# Patient Record
Sex: MALE | Race: WHITE
[De-identification: names, ages, dates, MRNs, and addresses within clinical notes are randomized per-mention and may not be internally consistent; named-entity substitution may affect disease eponyms.]

---

## 2018-08-14 ENCOUNTER — HOSPITAL ENCOUNTER (INPATIENT)
Dept: HOSPITAL 8 - 5SO | Age: 68
LOS: 3 days | Discharge: HOME | DRG: 308 | End: 2018-08-17
Attending: INTERNAL MEDICINE | Admitting: INTERNAL MEDICINE
Payer: MEDICARE

## 2018-08-14 VITALS — SYSTOLIC BLOOD PRESSURE: 120 MMHG | DIASTOLIC BLOOD PRESSURE: 75 MMHG

## 2018-08-14 VITALS — SYSTOLIC BLOOD PRESSURE: 122 MMHG | DIASTOLIC BLOOD PRESSURE: 75 MMHG

## 2018-08-14 VITALS — DIASTOLIC BLOOD PRESSURE: 75 MMHG | SYSTOLIC BLOOD PRESSURE: 115 MMHG

## 2018-08-14 VITALS — BODY MASS INDEX: 29.19 KG/M2 | HEIGHT: 75 IN | WEIGHT: 234.79 LBS

## 2018-08-14 DIAGNOSIS — Z79.84: ICD-10-CM

## 2018-08-14 DIAGNOSIS — Z79.899: ICD-10-CM

## 2018-08-14 DIAGNOSIS — I42.9: ICD-10-CM

## 2018-08-14 DIAGNOSIS — I10: ICD-10-CM

## 2018-08-14 DIAGNOSIS — I25.10: ICD-10-CM

## 2018-08-14 DIAGNOSIS — I48.91: ICD-10-CM

## 2018-08-14 DIAGNOSIS — I49.3: Primary | ICD-10-CM

## 2018-08-14 DIAGNOSIS — I44.0: ICD-10-CM

## 2018-08-14 DIAGNOSIS — E11.9: ICD-10-CM

## 2018-08-14 DIAGNOSIS — N17.0: ICD-10-CM

## 2018-08-14 DIAGNOSIS — Z87.891: ICD-10-CM

## 2018-08-14 DIAGNOSIS — N28.9: ICD-10-CM

## 2018-08-14 DIAGNOSIS — E78.5: ICD-10-CM

## 2018-08-14 DIAGNOSIS — Z88.8: ICD-10-CM

## 2018-08-14 DIAGNOSIS — I25.2: ICD-10-CM

## 2018-08-14 LAB
ANION GAP SERPL CALC-SCNC: 8 MMOL/L (ref 5–15)
CALCIUM SERPL-MCNC: 9 MG/DL (ref 8.5–10.1)
CHLORIDE SERPL-SCNC: 105 MMOL/L (ref 98–107)
CHOL/HDL RATIO: 4.2
CREAT SERPL-MCNC: 2.37 MG/DL (ref 0.7–1.3)
HDL CHOL %: 24 % (ref 26–37)
HDL CHOLESTEROL (DIRECT): 28 MG/DL (ref 40–60)
LDL CHOLESTEROL,CALCULATED: 54 MG/DL (ref 54–169)
LDLC/HDLC SERPL: 1.9 {RATIO} (ref 0.5–3)
T4 FREE SERPL-MCNC: 1.22 NG/DL (ref 0.76–1.46)
TRIGL SERPL-MCNC: 181 MG/DL (ref 50–200)
TSH SERPL-ACNC: 0.67 MIU/L (ref 0.36–3.74)
VLDLC SERPL CALC-MCNC: 36 MG/DL (ref 0–25)

## 2018-08-14 PROCEDURE — 80048 BASIC METABOLIC PNL TOTAL CA: CPT

## 2018-08-14 PROCEDURE — 84443 ASSAY THYROID STIM HORMONE: CPT

## 2018-08-14 PROCEDURE — 84439 ASSAY OF FREE THYROXINE: CPT

## 2018-08-14 PROCEDURE — 85018 HEMOGLOBIN: CPT

## 2018-08-14 PROCEDURE — 36415 COLL VENOUS BLD VENIPUNCTURE: CPT

## 2018-08-14 PROCEDURE — 80061 LIPID PANEL: CPT

## 2018-08-14 PROCEDURE — 71046 X-RAY EXAM CHEST 2 VIEWS: CPT

## 2018-08-14 PROCEDURE — 93005 ELECTROCARDIOGRAM TRACING: CPT

## 2018-08-14 PROCEDURE — 85014 HEMATOCRIT: CPT

## 2018-08-14 RX ADMIN — FENOFIBRATE SCH MG: 145 TABLET, FILM COATED ORAL at 20:48

## 2018-08-14 RX ADMIN — PRAVASTATIN SODIUM SCH MG: 40 TABLET ORAL at 20:48

## 2018-08-14 RX ADMIN — AMLODIPINE BESYLATE SCH MG: 5 TABLET ORAL at 20:48

## 2018-08-14 RX ADMIN — SOTALOL HYDROCHLORIDE SCH MG: 80 TABLET ORAL at 11:27

## 2018-08-14 RX ADMIN — SOTALOL HYDROCHLORIDE SCH MG: 80 TABLET ORAL at 22:25

## 2018-08-14 RX ADMIN — ASPIRIN SCH MG: 325 TABLET, FILM COATED ORAL at 20:47

## 2018-08-14 RX ADMIN — LISINOPRIL SCH MG: 10 TABLET ORAL at 20:48

## 2018-08-15 VITALS — SYSTOLIC BLOOD PRESSURE: 123 MMHG | DIASTOLIC BLOOD PRESSURE: 78 MMHG

## 2018-08-15 VITALS — SYSTOLIC BLOOD PRESSURE: 105 MMHG | DIASTOLIC BLOOD PRESSURE: 55 MMHG

## 2018-08-15 VITALS — DIASTOLIC BLOOD PRESSURE: 71 MMHG | SYSTOLIC BLOOD PRESSURE: 106 MMHG

## 2018-08-15 VITALS — DIASTOLIC BLOOD PRESSURE: 72 MMHG | SYSTOLIC BLOOD PRESSURE: 121 MMHG

## 2018-08-15 VITALS — SYSTOLIC BLOOD PRESSURE: 103 MMHG | DIASTOLIC BLOOD PRESSURE: 62 MMHG

## 2018-08-15 RX ADMIN — LISINOPRIL SCH MG: 10 TABLET ORAL at 21:41

## 2018-08-15 RX ADMIN — AMLODIPINE BESYLATE SCH MG: 5 TABLET ORAL at 21:40

## 2018-08-15 RX ADMIN — PRAVASTATIN SODIUM SCH MG: 40 TABLET ORAL at 21:41

## 2018-08-15 RX ADMIN — FENOFIBRATE SCH MG: 145 TABLET, FILM COATED ORAL at 21:41

## 2018-08-15 RX ADMIN — SOTALOL HYDROCHLORIDE SCH MG: 80 TABLET ORAL at 09:33

## 2018-08-15 RX ADMIN — ASPIRIN SCH MG: 325 TABLET, FILM COATED ORAL at 21:41

## 2018-08-16 VITALS — SYSTOLIC BLOOD PRESSURE: 125 MMHG | DIASTOLIC BLOOD PRESSURE: 77 MMHG

## 2018-08-16 VITALS — DIASTOLIC BLOOD PRESSURE: 60 MMHG | SYSTOLIC BLOOD PRESSURE: 114 MMHG

## 2018-08-16 VITALS — SYSTOLIC BLOOD PRESSURE: 111 MMHG | DIASTOLIC BLOOD PRESSURE: 66 MMHG

## 2018-08-16 VITALS — DIASTOLIC BLOOD PRESSURE: 67 MMHG | SYSTOLIC BLOOD PRESSURE: 105 MMHG

## 2018-08-16 LAB
ANION GAP SERPL CALC-SCNC: 9 MMOL/L (ref 5–15)
CALCIUM SERPL-MCNC: 9.5 MG/DL (ref 8.5–10.1)
CHLORIDE SERPL-SCNC: 103 MMOL/L (ref 98–107)
CREAT SERPL-MCNC: 2.15 MG/DL (ref 0.7–1.3)

## 2018-08-16 RX ADMIN — LISINOPRIL SCH MG: 10 TABLET ORAL at 21:48

## 2018-08-16 RX ADMIN — AMLODIPINE BESYLATE SCH MG: 5 TABLET ORAL at 21:47

## 2018-08-16 RX ADMIN — SOTALOL HYDROCHLORIDE SCH MG: 80 TABLET ORAL at 06:21

## 2018-08-16 RX ADMIN — PRAVASTATIN SODIUM SCH MG: 40 TABLET ORAL at 21:47

## 2018-08-16 RX ADMIN — FENOFIBRATE SCH MG: 145 TABLET, FILM COATED ORAL at 21:48

## 2018-08-16 RX ADMIN — ASPIRIN SCH MG: 325 TABLET, FILM COATED ORAL at 21:00

## 2018-08-17 VITALS — SYSTOLIC BLOOD PRESSURE: 122 MMHG | DIASTOLIC BLOOD PRESSURE: 74 MMHG

## 2018-08-17 VITALS — DIASTOLIC BLOOD PRESSURE: 64 MMHG | SYSTOLIC BLOOD PRESSURE: 110 MMHG

## 2018-08-17 RX ADMIN — SOTALOL HYDROCHLORIDE SCH MG: 80 TABLET ORAL at 05:59

## 2019-02-14 ENCOUNTER — HOSPITAL ENCOUNTER (OUTPATIENT)
Dept: HOSPITAL 8 - CFH | Age: 69
Discharge: HOME | End: 2019-02-14
Attending: INTERNAL MEDICINE
Payer: MEDICARE

## 2019-02-14 DIAGNOSIS — I08.2: Primary | ICD-10-CM

## 2019-02-14 DIAGNOSIS — I25.2: ICD-10-CM

## 2019-02-14 DIAGNOSIS — I11.9: ICD-10-CM

## 2019-02-14 DIAGNOSIS — E11.9: ICD-10-CM

## 2019-02-14 DIAGNOSIS — Z87.891: ICD-10-CM

## 2019-02-14 DIAGNOSIS — I25.10: ICD-10-CM

## 2019-02-14 PROCEDURE — 93306 TTE W/DOPPLER COMPLETE: CPT

## 2020-06-08 ENCOUNTER — OFFICE VISIT (OUTPATIENT)
Dept: ENDOCRINOLOGY | Facility: MEDICAL CENTER | Age: 70
End: 2020-06-08
Payer: MEDICARE

## 2020-06-08 VITALS
HEART RATE: 75 BPM | OXYGEN SATURATION: 90 % | SYSTOLIC BLOOD PRESSURE: 128 MMHG | HEIGHT: 75 IN | DIASTOLIC BLOOD PRESSURE: 74 MMHG | BODY MASS INDEX: 29.84 KG/M2 | WEIGHT: 240 LBS

## 2020-06-08 DIAGNOSIS — Z79.4 TYPE 2 DIABETES MELLITUS WITH HYPERGLYCEMIA, WITH LONG-TERM CURRENT USE OF INSULIN (HCC): ICD-10-CM

## 2020-06-08 DIAGNOSIS — N18.30 CKD STAGE 3 DUE TO TYPE 1 DIABETES MELLITUS (HCC): ICD-10-CM

## 2020-06-08 DIAGNOSIS — E10.22 CKD STAGE 3 DUE TO TYPE 1 DIABETES MELLITUS (HCC): ICD-10-CM

## 2020-06-08 DIAGNOSIS — E11.65 TYPE 2 DIABETES MELLITUS WITH HYPERGLYCEMIA, WITH LONG-TERM CURRENT USE OF INSULIN (HCC): ICD-10-CM

## 2020-06-08 DIAGNOSIS — E78.5 DYSLIPIDEMIA: ICD-10-CM

## 2020-06-08 DIAGNOSIS — Z79.4 LONG-TERM INSULIN USE (HCC): ICD-10-CM

## 2020-06-08 PROCEDURE — 99204 OFFICE O/P NEW MOD 45 MIN: CPT | Performed by: INTERNAL MEDICINE

## 2020-06-08 RX ORDER — INSULIN DEGLUDEC 100 U/ML
30 INJECTION, SOLUTION SUBCUTANEOUS
Qty: 5 PEN | Refills: 6 | Status: SHIPPED | OUTPATIENT
Start: 2020-06-08 | End: 2020-11-25 | Stop reason: SDUPTHER

## 2020-06-08 RX ORDER — SOTALOL HYDROCHLORIDE 80 MG/1
TABLET ORAL DAILY
COMMUNITY
Start: 2020-04-21

## 2020-06-08 RX ORDER — ICOSAPENT ETHYL 1000 MG/1
CAPSULE ORAL
COMMUNITY
Start: 2020-06-04 | End: 2021-10-20

## 2020-06-08 RX ORDER — FENOFIBRATE 160 MG/1
TABLET ORAL
COMMUNITY
Start: 2020-05-24 | End: 2022-05-16 | Stop reason: SDUPTHER

## 2020-06-08 RX ORDER — GLIPIZIDE 10 MG/1
TABLET, FILM COATED, EXTENDED RELEASE ORAL
COMMUNITY
Start: 2020-03-17 | End: 2020-11-25 | Stop reason: SDUPTHER

## 2020-06-08 RX ORDER — SEMAGLUTIDE 1.34 MG/ML
0.5 INJECTION, SOLUTION SUBCUTANEOUS
Qty: 1 PEN | Refills: 11 | Status: SHIPPED | OUTPATIENT
Start: 2020-06-08 | End: 2020-12-16

## 2020-06-08 RX ORDER — TESTOSTERONE CYPIONATE 200 MG/ML
200 INJECTION, SOLUTION INTRAMUSCULAR
COMMUNITY
Start: 2020-05-11 | End: 2022-11-18 | Stop reason: SDUPTHER

## 2020-06-08 RX ORDER — AMLODIPINE BESYLATE 5 MG/1
10 TABLET ORAL DAILY
COMMUNITY
Start: 2020-05-24

## 2020-06-08 RX ORDER — LISINOPRIL 5 MG/1
TABLET ORAL
COMMUNITY
Start: 2020-03-27 | End: 2021-03-01

## 2020-06-08 NOTE — PROGRESS NOTES
"Chief Complaint:  Consult requested by Tushar Stanley M.D. for initial evaluation of Type 2 Diabetes Mellitus    HPI:   Kaveh Mayfield is a 69 y.o. male with Type 2 Diabetes Mellitus diagnosed in 2000.  He denies hospitalizations for DKA in the past.    His A1c was elevated at 10% on Linda 3, 2020 from labs from [facility].  He has a history of coronary artery disease and chronic kidney disease and is being followed by Dr. Reeves and another nephrologist.  He is not sure of the details of his CVD history.  Records are not on hand.  His recent GFR is 30 with a serum creatinine of 2.6 on June 2020 labs are from [facility]    He reports progressive hyperglycemic symptoms such as polyuria and polydipsia.  He was previously on metformin but was taken off this medication.  He is currently just on monotherapy with glipizide extended release 10 mg daily for his diabetes and his fasting sugars are in the 300s.    He monitors blood glucose  1 times per day. Blood glucose values range:Breakfast: 240, 265, 330           He denies hypoglycemic episodes.   He  denies hypoglycemic unawareness. He denies episodes of severe hypoglycemia requiring third party assistance.  He  is not wearing a medical alert bracelet or necklace.  He does not a glucagon emergency kit.    He denies attending diabetes education classes.  Diet: \"unhealthy\" diet in general.    Diabetes Complications   He  denies history of retinopathy.  He denies laser eye surgery. Last eye exam: He is unsure when his last eye exam was completed  He reports history of peripheral sensory neuropathy.  He reports numbness, tingling in both feet.  He denies history of foot sores.   He denies history of kidney damage.  He is not on ACE inhibitor or ARB.   He denies history of coronary artery disease.  He  denies history of stroke and denies TIA.  He denies history of PAD.  He reports history of hyperlipidemia.  He is on triple therapy with Vascepa, " pravastatin and fenofibrate.      ROS:     CONS:     No fever, no chills, no weight loss, no fatigue   EYES:      No diplopia, reports blurry vision, no redness of eyes, no swelling of eyelids   ENT:    No hearing loss, No ear pain, No sore throat, no dysphagia, no neck swelling   CV:     No chest pain, no palpitations, no claudication, no orthopnea, no PND   PULM:    No SOB, no cough, no hemoptysis, no wheezing    GI:   No nausea, no vomiting, no diarrhea, no constipation, no bloody stools   :  Passing urine well, no dysuria, no hematuria   ENDO:   reports polyuria, no polydipsia, no heat intolerance, no cold intolerance   NEURO: No headaches, no dizziness, no convulsions, no tremors   MUSC:  No joint swellings, no arthralgias, no myalgias, no weakness   SKIN:   No rash, no ulcers, no dry skin   PSYCH:   No depression, no anxiety, no difficulty sleeping       Past Medical History:  Patient Active Problem List    Diagnosis Date Noted   • CAD (coronary artery disease) 06/28/2011   • A-fib (HCC) 06/28/2011   • HTN (hypertension) 06/28/2011   • Elevated lipids 06/28/2011       Past Surgical History:  Past Surgical History:   Procedure Laterality Date   • SHOULDER ARTHROSCOPY W/ ROTATOR CUFF REPAIR  8/7/2013    Performed by Mickey Liu M.D. at [facility]   • CLAVICLE DISTAL EXCISION  8/7/2013    Performed by Mickey Liu M.D. at [facility]   • SHOULDER DECOMPRESSION ARTHROSCOPIC  8/7/2013    Performed by Mickey Liu M.D. at [facility]   • ANGIOGRAM  2006    stent placement        Allergies:  Nkda [no known drug allergy]     Current Medications:    Current Outpatient Medications:   •  lisinopril (PRINIVIL) 5 MG Tab, , Disp: , Rfl:   •  testosterone cypionate (DEPO-TESTOSTERONE) 200 MG/ML Solution injection, , Disp: , Rfl:   •  glipiZIDE SR (GLUCOTROL) 10 MG TABLET SR 24 HR, TK 1 T PO  QD FOR 90 DAYS, Disp: , Rfl:   •  VASCEPA 1 g Cap, TK 2 C PO BID  WITH FOOD, Disp: , Rfl:   •  fenofibrate (TRIGLIDE) 160 MG tablet, TK 1 T PO QD UTD, Disp: , Rfl:   •  sotalol (BETAPACE) 80 MG Tab, Take  by mouth every day. Take 1 tablet by mouth every day, Disp: , Rfl:   •  amLODIPine (NORVASC) 5 MG Tab, TK 2 TS PO QD IN THE EVENING, Disp: , Rfl:   •  aspirin (ASA) 325 MG Tab, Take 325 mg by mouth every day., Disp: , Rfl:   •  metformin (GLUCOPHAGE) 500 MG Tab, Take 500 mg by mouth 2 times a day, with meals., Disp: , Rfl:   •  hydrocodone-acetaminophen (NORCO) 5-325 MG Tab per tablet, Take 1-2 Tabs by mouth every four hours as needed (mild pain)., Disp: 20 Tab, Rfl: 0  •  pravastatin (PRAVACHOL) 40 MG tablet, Take 1 tablet by mouth at bedtime. Patient must make appt for further refills., Disp: 90 Tab, Rfl: 0  •  ondansetron (ZOFRAN ODT) 4 MG TABLET DISPERSIBLE, Take 1 Tab by mouth every 8 hours as needed for Nausea/Vomiting. (Patient not taking: Reported on 6/8/2020), Disp: 10 Tab, Rfl: 0  •  MethylPREDNISolone (MEDROL DOSEPAK) 4 MG Tablet Therapy Pack, Sig: Per instructions on pack (Patient not taking: Reported on 6/8/2020), Disp: 1 Kit, Rfl: 0  •  lisinopril (PRINIVIL, ZESTRIL) 40 MG tablet, TAKE ONE TABLET BY MOUTH EVERY DAY (Patient not taking: Reported on 6/8/2020), Disp: 90 Tab, Rfl: 3  •  TESTOSTERONE IM, by Intramuscular route every 14 days., Disp: , Rfl:     Social History:  Social History     Socioeconomic History   • Marital status:      Spouse name: Not on file   • Number of children: Not on file   • Years of education: Not on file   • Highest education level: Not on file   Occupational History   • Not on file   Social Needs   • Financial resource strain: Not on file   • Food insecurity     Worry: Not on file     Inability: Not on file   • Transportation needs     Medical: Not on file     Non-medical: Not on file   Tobacco Use   • Smoking status: Former Smoker     Packs/day: 3.00     Years: 32.00     Pack years: 96.00     Types: Cigarettes     Last attempt to  "quit: 2006     Years since quittin.1   • Smokeless tobacco: Never Used   Substance and Sexual Activity   • Alcohol use: Yes     Alcohol/week: 1.5 oz     Types: 3 drink(s) per week   • Drug use: No   • Sexual activity: Not on file   Lifestyle   • Physical activity     Days per week: Not on file     Minutes per session: Not on file   • Stress: Not on file   Relationships   • Social connections     Talks on phone: Not on file     Gets together: Not on file     Attends Zoroastrian service: Not on file     Active member of club or organization: Not on file     Attends meetings of clubs or organizations: Not on file     Relationship status: Not on file   • Intimate partner violence     Fear of current or ex partner: Not on file     Emotionally abused: Not on file     Physically abused: Not on file     Forced sexual activity: Not on file   Other Topics Concern   • Not on file   Social History Narrative   • Not on file        Family History:   Family History   Problem Relation Age of Onset   • Heart Disease Other    • Hypertension Other    • Stroke Other        PHYSICAL EXAM:   Vital signs: /74 (BP Location: Left arm, Patient Position: Sitting, BP Cuff Size: Large adult)   Pulse 75   Ht 1.905 m (6' 3\")   Wt 108.9 kg (240 lb)   SpO2 90%   BMI 30.00 kg/m²   GENERAL: Well-developed, well-nourished  in no apparent distress.   EYE: No ocular and eyelid asymmetry, Anicteric sclerae,  PERRL, No exophthalmos or lidlag  HENT: Hearing grossly intact, Normocephalic, atraumatic. Pink, moist mucous membranes, No exudate  NECK: Supple. Trachea midline. thyroid is normal in size without nodules or tenderness  CARDIOVASCULAR: Regular rate and rhythm. No murmurs, rubs, or gallops.   LUNGS: Clear to auscultation bilaterally   ABDOMEN: Soft, nontender with positive bowel sounds.   EXTREMITIES: No clubbing, cyanosis, or edema.   NEUROLOGICAL: Cranial nerves II-XII are grossly intact   Symmetric reflexes at the patella no " proximal muscle weakness, No visible tremor with both outstretched hands  LYMPH: No cervical, supraclavicular,  adenopathy palpated.   SKIN: No rashes, lesions. Turgor is normal.  FOOT: Normal sensation to monofilament testing, normal pulses, no ulcers.  Normal Vibration quantitative sensation test.    Labs:  Lab Results   Component Value Date/Time    HBA1C 5.1 04/27/2006 0325       Lab Results   Component Value Date/Time    WBC 8.9 10/17/2016 09:46 PM    RBC 4.52 (L) 10/17/2016 09:46 PM    HEMOGLOBIN 15.1 10/17/2016 09:46 PM    .5 (H) 10/17/2016 09:46 PM    MCH 33.4 (H) 10/17/2016 09:46 PM    MCHC 32.9 (L) 10/17/2016 09:46 PM    RDW 70.4 (H) 10/17/2016 09:46 PM    MPV 11.1 10/17/2016 09:46 PM       Lab Results   Component Value Date/Time    SODIUM 131 (L) 10/17/2016 09:46 PM    POTASSIUM 4.8 10/17/2016 09:46 PM    CHLORIDE 97 10/17/2016 09:46 PM    CO2 23 10/17/2016 09:46 PM    ANION 11.0 10/17/2016 09:46 PM    GLUCOSE 200 (H) 10/17/2016 09:46 PM    BUN 19 10/17/2016 09:46 PM    CREATININE 1.58 (H) 10/17/2016 09:46 PM    CREATININE 0.9 04/27/2006 03:25 AM    CALCIUM 9.8 10/17/2016 09:46 PM    ASTSGOT 46 (H) 10/17/2016 09:46 PM    ALTSGPT 29 10/17/2016 09:46 PM    TBILIRUBIN 1.0 10/17/2016 09:46 PM    ALBUMIN 4.6 10/17/2016 09:46 PM    TOTPROTEIN 7.7 10/17/2016 09:46 PM    GLOBULIN 3.1 10/17/2016 09:46 PM    AGRATIO 1.5 10/17/2016 09:46 PM       No results found for: CHOLSTRLTOT, TRIGLYCERIDE, HDL, LDL, CHOLHDLRAT, NONHDL    No results found for: MICROALBCALC, MALBCRT, MALBEXCR, TIOHCI45, MICROALBUR, MICRALB, UMICROALBUM, MICROALBTIM     No results found for: TSHULTRASEN  No results found for: FREEDIR  No results found for: FREET3  No results found for: THYSTIMIG      ASSESSMENT/PLAN:     1. Type 2 diabetes mellitus with hyperglycemia, with long-term current use of insulin (HCC)  Uncontrolled secondary hyperglycemia  Because he has advanced chronic kidney disease and coronary artery disease   I recommend  starting Tresiba 20 units daily with titration of his basal insulin until fasting sugars at goal or less than 130  I also want him to start Ozempic 0.25 mg once a week  I reviewed the side effects of this medication  Recommend that he cut back on fruit  I am referring him for diabetes education  We will plan for follow-up in 2 months to review sugars    2. Dyslipidemia  Unstable  Continue pravastatin, omega-3 fatty acids and fenofibrate  We will repeat fasting lipids in 3 months    3. CKD stage 3 due to type 1 diabetes mellitus (HCC)  Unstable  Discuss the importance of staying well-hydrated and avoiding potential nephrotoxins    4. Long-term insulin use (HCC)  Patient is on long-term basal insulin therapy and a GLP-1 for type 2 diabetes management      Return in about 2 months (around 8/8/2020).       This patient during there office visit was started on new medication.  Side effects of new medications were discussed with the patient today in the office. The patient was supplied paperwork on this new medication.    Thank you kindly for allowing me to participate in the diabetes care plan for this patient.    Espinoza Poole MD, [facility], [facility]  06/08/20    CC:   Tushar Stanley M.D.

## 2020-06-16 ENCOUNTER — TELEPHONE (OUTPATIENT)
Dept: ENDOCRINOLOGY | Facility: MEDICAL CENTER | Age: 70
End: 2020-06-16

## 2020-06-16 NOTE — TELEPHONE ENCOUNTER
DOCUMENTATION OF PAR STATUS:    1. Name of Medication & Dose: Tresiba 100 units     2. Name of Prescription Coverage Company & phone #: cover my meds    3. Date Prior Auth Submitted: 06/16/20    4. What information was given to obtain insurance decision? Last office notes     5. Prior Auth Status? Pending    6. Patient Notified: yes

## 2020-06-16 NOTE — TELEPHONE ENCOUNTER
DOCUMENTATION OF PAR STATUS:    1. Name of Medication & Dose: Ozempic 0.25-0.25mg/Dose     2. Name of Prescription Coverage Company & phone #: cover my meds    3. Date Prior Auth Submitted: 06/16/2020    4. What information was given to obtain insurance decision? Last office notes    5. Prior Auth Status? Pending    6. Patient Notified: yes

## 2020-08-19 ENCOUNTER — OFFICE VISIT (OUTPATIENT)
Dept: ENDOCRINOLOGY | Facility: MEDICAL CENTER | Age: 70
End: 2020-08-19
Attending: INTERNAL MEDICINE
Payer: MEDICARE

## 2020-08-19 VITALS
WEIGHT: 226.7 LBS | BODY MASS INDEX: 28.19 KG/M2 | DIASTOLIC BLOOD PRESSURE: 72 MMHG | OXYGEN SATURATION: 94 % | SYSTOLIC BLOOD PRESSURE: 120 MMHG | HEIGHT: 75 IN | HEART RATE: 70 BPM

## 2020-08-19 DIAGNOSIS — E78.5 DYSLIPIDEMIA: ICD-10-CM

## 2020-08-19 DIAGNOSIS — N18.30 CKD STAGE 3 DUE TO TYPE 2 DIABETES MELLITUS (HCC): ICD-10-CM

## 2020-08-19 DIAGNOSIS — Z79.4 LONG-TERM INSULIN USE (HCC): ICD-10-CM

## 2020-08-19 DIAGNOSIS — E11.22 CKD STAGE 3 DUE TO TYPE 2 DIABETES MELLITUS (HCC): ICD-10-CM

## 2020-08-19 DIAGNOSIS — Z79.4 TYPE 2 DIABETES MELLITUS WITH HYPERGLYCEMIA, WITH LONG-TERM CURRENT USE OF INSULIN (HCC): ICD-10-CM

## 2020-08-19 DIAGNOSIS — E11.65 TYPE 2 DIABETES MELLITUS WITH HYPERGLYCEMIA, WITH LONG-TERM CURRENT USE OF INSULIN (HCC): ICD-10-CM

## 2020-08-19 LAB
HBA1C MFR BLD: 8.5 % (ref 0–5.6)
INT CON NEG: NEGATIVE
INT CON POS: POSITIVE

## 2020-08-19 PROCEDURE — 83036 HEMOGLOBIN GLYCOSYLATED A1C: CPT | Performed by: INTERNAL MEDICINE

## 2020-08-19 PROCEDURE — 99214 OFFICE O/P EST MOD 30 MIN: CPT | Performed by: INTERNAL MEDICINE

## 2020-08-19 PROCEDURE — 99212 OFFICE O/P EST SF 10 MIN: CPT | Performed by: INTERNAL MEDICINE

## 2020-08-20 NOTE — PROGRESS NOTES
CHIEF COMPLAINT: Patient is here for follow up of Type 2 Diabetes Mellitus    HPI:     Kaveh Mayfield is a 69 y.o. male with Type 2 Diabetes Mellitus here for follow up.    Labs from 8/19/2020 HbA1c is improved at 8.5%  His baseline A1c was 10% at [facility] back in May 2020  Most of his labs are done at an outside facility      I first saw him as a consult on July 2020 for uncontrolled diabetes.  He has a history of coronary artery disease, chronic kidney disease st III and obesity  After his initial consultation visit I started him on a GLP-1.  Unfortunately because of his chronic kidney disease he is not a candidate for an SGLT2 inhibitor.        On follow-up he is taking Tresiba 20 units daily, Ozempic 0.25 mg once a week and glipizide XL 10 mg daily    He reports feeling better.  He is tolerating his medications and denies nausea and vomiting  He denies severe hyperglycemia  He denies hypoglycemia      He has mixed hyperlipidemia at baseline and is taking pravastatin and fenofibrate  He is tolerating both medications well  He denies muscle aches and cramps  We do not have a recent lipid panel on hand      Regards his chronic kidney disease he reports that he is doing well, we do not have a recent GFR or CMP level on hand.  Most of his labs unfortunately are done at [place]  He is being followed by nephrologist Dr. Reeves  He reports that his last GFR was 30 on June 2020 and his serum creatinine was 2.6.  He is unsure if he has proteinuria  He is on an ACE inhibitor      He is also on sotalol because of paroxysmal A. fib  He has hypogonadism and is on testosterone injections but this is being managed by another physician      BG Diary:08/19/20  Breakfast: 100, 102, 110    Weight has decreased 10 pounds over last 2 months    Diabetes Complications   Retinopathy: No known retinopathy.  Last eye exam: We are requesting records of his eye exam  Neuropathy: Denies paresthesias or numbness in  hands or feet. Denies any foot wounds.  Exercise: Minimal.  Diet: Fair.  Patient's medications, allergies, and social histories were reviewed and updated as appropriate.    ROS:     CONS:     No fever, no chills   EYES:     No diplopia, no blurry vision   CV:           No chest pain, no palpitations   PULM:     No SOB, no cough, no hemoptysis.   GI:            No nausea, no vomiting, no diarrhea, no constipation   ENDO:     No polyuria, no polydipsia, no heat intolerance, no cold intolerance       Past Medical History:  Problem List:  2020: Type 2 diabetes mellitus with hyperglycemia, with long-term   current use of insulin ([facility])  2020: Dyslipidemia  2020: CKD stage 3 due to type 1 diabetes mellitus ([facility])  2020: Long-term insulin use ([facility])  2011: CAD (coronary artery disease)  2011: A-fib ([facility])  2011: HTN (hypertension)  2011: Elevated lipids      Past Surgical History:  Past Surgical History:   Procedure Laterality Date   • SHOULDER ARTHROSCOPY W/ ROTATOR CUFF REPAIR  2013    Performed by Mickey Liu M.D. at [facility]   • CLAVICLE DISTAL EXCISION  2013    Performed by Mickey Liu M.D. at [facility]   • SHOULDER DECOMPRESSION ARTHROSCOPIC  2013    Performed by Mickey Liu M.D. at [facility]   • ANGIOGRAM  2006    stent placement        Allergies:  Nkda [no known drug allergy]     Social History:  Social History     Tobacco Use   • Smoking status: Former Smoker     Packs/day: 3.00     Years: 32.00     Pack years: 96.00     Types: Cigarettes     Quit date: 2006     Years since quittin.3   • Smokeless tobacco: Never Used   Substance Use Topics   • Alcohol use: Yes     Alcohol/week: 1.5 oz     Types: 3 drink(s) per week   • Drug use: No        Family History:   family history includes Heart Disease in an other family member; Hypertension in an other family member; Stroke in an other family  "member.      PHYSICAL EXAM:   OBJECTIVE:  Vital signs: /72 (BP Location: Left arm, Patient Position: Sitting, BP Cuff Size: Adult)   Pulse 70   Ht 1.905 m (6' 3\")   Wt 102.8 kg (226 lb 11.2 oz)   SpO2 94%   BMI 28.34 kg/m²   GENERAL: Well-developed, well-nourished in no apparent distress.   EYE:  No ocular asymmetry, PERRLA  HENT: Pink, moist mucous membranes.    NECK: No thyromegaly.   CARDIOVASCULAR:  No murmurs  LUNGS: Clear breath sounds  ABDOMEN: Soft, nontender   EXTREMITIES: No clubbing, cyanosis, or edema.   NEUROLOGICAL: No gross focal motor abnormalities   LYMPH: No cervical adenopathy palpated.   SKIN: No rashes, lesions.     Labs:  Lab Results   Component Value Date/Time    HBA1C 8.5 (A) 08/19/2020 12:00 PM        Lab Results   Component Value Date/Time    WBC 8.9 10/17/2016 09:46 PM    RBC 4.52 (L) 10/17/2016 09:46 PM    HEMOGLOBIN 15.1 10/17/2016 09:46 PM    .5 (H) 10/17/2016 09:46 PM    MCH 33.4 (H) 10/17/2016 09:46 PM    MCHC 32.9 (L) 10/17/2016 09:46 PM    RDW 70.4 (H) 10/17/2016 09:46 PM    MPV 11.1 10/17/2016 09:46 PM       Lab Results   Component Value Date/Time    SODIUM 131 (L) 10/17/2016 09:46 PM    POTASSIUM 4.8 10/17/2016 09:46 PM    CHLORIDE 97 10/17/2016 09:46 PM    CO2 23 10/17/2016 09:46 PM    ANION 11.0 10/17/2016 09:46 PM    GLUCOSE 200 (H) 10/17/2016 09:46 PM    BUN 19 10/17/2016 09:46 PM    CREATININE 1.58 (H) 10/17/2016 09:46 PM    CREATININE 0.9 04/27/2006 03:25 AM    CALCIUM 9.8 10/17/2016 09:46 PM    ASTSGOT 46 (H) 10/17/2016 09:46 PM    ALTSGPT 29 10/17/2016 09:46 PM    TBILIRUBIN 1.0 10/17/2016 09:46 PM    ALBUMIN 4.6 10/17/2016 09:46 PM    TOTPROTEIN 7.7 10/17/2016 09:46 PM    GLOBULIN 3.1 10/17/2016 09:46 PM    AGRATIO 1.5 10/17/2016 09:46 PM       No results found for: CHOLSTRLTOT, TRIGLYCERIDE, HDL, LDL, CHOLHDLRAT, NONHDL    No results found for: MICROALBCALC, MALBCRT, MALBEXCR, WLELOO03, MICROALBUR, MICRALB, UMICROALBUM, MICROALBTIM     No results found " for: TSHULTRASEN  No results found for: FREEDIR  No results found for: FREET3  No results found for: THYSTIMIG        ASSESSMENT/PLAN:     1. Type 2 diabetes mellitus with hyperglycemia, with long-term current use of insulin (HCC)  Uncontrolled at baseline secondary to hyperglycemia  Blood sugars are significantly better  A1c has dropped significantly by 2 percentage points in the short period of time that I have seen him    Continue Tresiba 20 units daily  Increase Ozempic to 0.5 mg weekly  Advised patient to discontinue glipizide if he has hypoglycemia    I recommend that he watch his carb intake  I recommend regular exercise  I recommend that he remain well-hydrated  We will plan for follow-up in 3 months with a repeat of his A1c      2. Dyslipidemia  Unstable  Continue pravastatin and fenofibrate  I am checking a fasting lipid profile with his next labs    3. CKD stage 3 due to type 2 diabetes mellitus (HCC)  Stable  I am checking a CMP with his next labs  Recommend that he avoid potential nephrotoxins such as NSAIDs and IV contrast  Discussed the importance of good blood pressure control and good glucose control    4. Long-term insulin use (HCC)  Patient is on long-term basal insulin therapy with a GLP-1 for type 2 diabetes management      Return in about 3 months (around 11/19/2020).      Thank you kindly for allowing me to participate in the diabetes care plan for this patient.    Espinoza Poole MD, [facility], [facility]  08/19/20    CC:   Tsuhar Stanley M.D.

## 2020-11-16 DIAGNOSIS — E11.65 TYPE 2 DIABETES MELLITUS WITH HYPERGLYCEMIA, WITH LONG-TERM CURRENT USE OF INSULIN (HCC): ICD-10-CM

## 2020-11-16 DIAGNOSIS — Z79.4 TYPE 2 DIABETES MELLITUS WITH HYPERGLYCEMIA, WITH LONG-TERM CURRENT USE OF INSULIN (HCC): ICD-10-CM

## 2020-11-16 DIAGNOSIS — E11.22 CKD STAGE 3 DUE TO TYPE 2 DIABETES MELLITUS (HCC): ICD-10-CM

## 2020-11-16 DIAGNOSIS — E78.5 DYSLIPIDEMIA: ICD-10-CM

## 2020-11-16 DIAGNOSIS — Z79.4 LONG-TERM INSULIN USE (HCC): ICD-10-CM

## 2020-11-16 DIAGNOSIS — N18.30 CKD STAGE 3 DUE TO TYPE 2 DIABETES MELLITUS (HCC): ICD-10-CM

## 2020-11-25 ENCOUNTER — TELEMEDICINE (OUTPATIENT)
Dept: ENDOCRINOLOGY | Facility: MEDICAL CENTER | Age: 70
End: 2020-11-25
Attending: INTERNAL MEDICINE
Payer: MEDICARE

## 2020-11-25 DIAGNOSIS — Z79.4 LONG-TERM INSULIN USE (HCC): ICD-10-CM

## 2020-11-25 DIAGNOSIS — N18.30 CKD STAGE 3 DUE TO TYPE 2 DIABETES MELLITUS (HCC): ICD-10-CM

## 2020-11-25 DIAGNOSIS — E78.5 DYSLIPIDEMIA: ICD-10-CM

## 2020-11-25 DIAGNOSIS — E11.22 CKD STAGE 3 DUE TO TYPE 2 DIABETES MELLITUS (HCC): ICD-10-CM

## 2020-11-25 DIAGNOSIS — E11.65 TYPE 2 DIABETES MELLITUS WITH HYPERGLYCEMIA, WITH LONG-TERM CURRENT USE OF INSULIN (HCC): ICD-10-CM

## 2020-11-25 DIAGNOSIS — Z79.4 TYPE 2 DIABETES MELLITUS WITH HYPERGLYCEMIA, WITH LONG-TERM CURRENT USE OF INSULIN (HCC): ICD-10-CM

## 2020-11-25 PROCEDURE — 99214 OFFICE O/P EST MOD 30 MIN: CPT | Mod: 95,CR | Performed by: INTERNAL MEDICINE

## 2020-11-25 RX ORDER — INSULIN GLARGINE 100 [IU]/ML
20 INJECTION, SOLUTION SUBCUTANEOUS EVERY EVENING
Qty: 5 EACH | Refills: 11 | Status: SHIPPED | OUTPATIENT
Start: 2020-11-25 | End: 2020-12-25

## 2020-11-25 RX ORDER — GLIPIZIDE 10 MG/1
TABLET, FILM COATED, EXTENDED RELEASE ORAL
Qty: 90 TAB | Refills: 90 | Status: SHIPPED | OUTPATIENT
Start: 2020-11-25 | End: 2021-03-01

## 2020-11-25 NOTE — PROGRESS NOTES
CHIEF COMPLAINT: Patient is here for follow up of Type 2 Diabetes Mellitus. Patient was presented for a telehealth consultation via secure and encrypted videoconferencing technology. This encounter was conducted via Zoom . Verbal consent was obtained. Patient's identity was verified.    HPI:     Kaveh Mayfield is a 69 y.o. male with Type 2 Diabetes Mellitus here for follow up.    Labs from 9/23/2020 show a1c was 8.0% ([facility] labs)  Labs from 8/19/2020 HbA1c is improved at 8.5%  His baseline A1c was 10% at [facility] back in May 2020  Most of his labs are done at an outside facility      I first saw him as a consult on July 2020 for uncontrolled diabetes.  He has a history of coronary artery disease, chronic kidney disease st III and obesity  After his initial consultation visit I started him on a GLP-1.  Unfortunately because of his chronic kidney disease he is not a candidate for an SGLT2 inhibitor likely due to low GFR (if less than 25)        On follow-up he is taking Tresiba 4 units daily (he was supposed to take 20u), Ozempic 0.25 mg once a week and glipizide XL 10 mg daily    He didn't understand his insulin injections  He is tolerating his medications and denies nausea and vomiting  He reports severe hyperglycemia      He has mixed hyperlipidemia at baseline and is taking pravastatin and fenofibrate  He is tolerating both medications well  He denies muscle aches and cramps  LDL-C was 93 on 11/2020      Regards his chronic kidney disease he reports that he is doing well  Most of his labs unfortunately are done at [place]  His creatinine 1.8 with outside labs from [facility]   He is being followed by nephrologist Dr. Reeves  He reports that his last GFR was 30 on June 2020 and his serum creatinine was 2.6.  He is unsure if he has proteinuria  He is on an ACE inhibitor      He is also on sotalol because of paroxysmal A. fib  He has hypogonadism and is on testosterone  injections but this is being managed by another physician      BG Diary:  Breakfast: 200, 230, 300    Weight has been stable    Diabetes Complications   Retinopathy: No known retinopathy.  Last eye exam: We are requesting records of his eye exam  Neuropathy: Denies paresthesias or numbness in hands or feet. Denies any foot wounds.  Exercise: Minimal.  Diet: Fair.  Patient's medications, allergies, and social histories were reviewed and updated as appropriate.    ROS:     CONS:     No fever, no chills   EYES:     No diplopia, no blurry vision   CV:           No chest pain, no palpitations   PULM:     No SOB, no cough, no hemoptysis.   GI:            No nausea, no vomiting, no diarrhea, no constipation   ENDO:     No polyuria, no polydipsia, no heat intolerance, no cold intolerance       Past Medical History:  Problem List:  2020: Type 2 diabetes mellitus with hyperglycemia, with long-term   current use of insulin ([facility])  2020: Dyslipidemia  2020: CKD stage 3 due to type 1 diabetes mellitus ([facility])  2020: Long-term insulin use ([facility])  2011: CAD (coronary artery disease)  2011: A-fib ([facility])  2011: HTN (hypertension)  2011: Elevated lipids      Past Surgical History:  Past Surgical History:   Procedure Laterality Date   • SHOULDER ARTHROSCOPY W/ ROTATOR CUFF REPAIR  2013    Performed by Mickey Liu M.D. at [facility]   • CLAVICLE DISTAL EXCISION  2013    Performed by Mickey Liu M.D. at [facility]   • SHOULDER DECOMPRESSION ARTHROSCOPIC  2013    Performed by Mickey Liu M.D. at [facility]   • ANGIOGRAM  2006    stent placement        Allergies:  Nkda [no known drug allergy]     Social History:  Social History     Tobacco Use   • Smoking status: Former Smoker     Packs/day: 3.00     Years: 32.00     Pack years: 96.00     Types: Cigarettes     Quit date: 2006     Years since quittin.6   • Smokeless tobacco: Never  Used   Substance Use Topics   • Alcohol use: Yes     Alcohol/week: 1.5 oz     Types: 3 drink(s) per week   • Drug use: No        Family History:   family history includes Heart Disease in an other family member; Hypertension in an other family member; Stroke in an other family member.      PHYSICAL EXAM:   OBJECTIVE:  Vital signs: There were no vitals taken for this visit.  GENERAL: Well-developed, well-nourished in no apparent distress.   EYE:  No ocular asymmetry, PERRLA  HENT: Pink, moist mucous membranes.    NECK: No thyromegaly.   CARDIOVASCULAR:  No murmurs  LUNGS: Clear breath sounds  ABDOMEN: Soft, nontender   EXTREMITIES: No clubbing, cyanosis, or edema.   NEUROLOGICAL: No gross focal motor abnormalities   LYMPH: No cervical adenopathy palpated.   SKIN: No rashes, lesions.     Labs:  Lab Results   Component Value Date/Time    HBA1C 8.5 (A) 08/19/2020 12:00 PM        Lab Results   Component Value Date/Time    WBC 8.9 10/17/2016 09:46 PM    RBC 4.52 (L) 10/17/2016 09:46 PM    HEMOGLOBIN 15.1 10/17/2016 09:46 PM    .5 (H) 10/17/2016 09:46 PM    MCH 33.4 (H) 10/17/2016 09:46 PM    MCHC 32.9 (L) 10/17/2016 09:46 PM    RDW 70.4 (H) 10/17/2016 09:46 PM    MPV 11.1 10/17/2016 09:46 PM       Lab Results   Component Value Date/Time    SODIUM 131 (L) 10/17/2016 09:46 PM    POTASSIUM 4.8 10/17/2016 09:46 PM    CHLORIDE 97 10/17/2016 09:46 PM    CO2 23 10/17/2016 09:46 PM    ANION 11.0 10/17/2016 09:46 PM    GLUCOSE 200 (H) 10/17/2016 09:46 PM    BUN 19 10/17/2016 09:46 PM    CREATININE 1.58 (H) 10/17/2016 09:46 PM    CREATININE 0.9 04/27/2006 03:25 AM    CALCIUM 9.8 10/17/2016 09:46 PM    ASTSGOT 46 (H) 10/17/2016 09:46 PM    ALTSGPT 29 10/17/2016 09:46 PM    TBILIRUBIN 1.0 10/17/2016 09:46 PM    ALBUMIN 4.6 10/17/2016 09:46 PM    TOTPROTEIN 7.7 10/17/2016 09:46 PM    GLOBULIN 3.1 10/17/2016 09:46 PM    AGRATIO 1.5 10/17/2016 09:46 PM       No results found for: CHOLSTRLTOT, TRIGLYCERIDE, HDL, LDL, CHOLHDLRAT,  NONHDL    No results found for: MICROALBCALC, MALBCRT, MALBEXCR, DHBYEM55, MICROALBUR, MICRALB, UMICROALBUM, MICROALBTIM     No results found for: TSHULTRASEN  No results found for: FREEDIR  No results found for: FREET3  No results found for: THYSTIMIG        ASSESSMENT/PLAN:     1. Type 2 diabetes mellitus with hyperglycemia, with long-term current use of insulin (HCC)  Uncontrolled at baseline secondary to hyperglycemia and non compliance  Increase Tresiba to 20 units  Continue Ozempic 0.5 mg weekly  Continue Glipizide 10mg XL  I recommend that he watch his carb intake  I recommend regular exercise  I recommend that he remain well-hydrated  We will plan for follow-up in 3 months with a repeat of his A1c      2. Dyslipidemia  Controlled  Continue pravastatin and fenofibrate  Repeat lipids in 12 months    3. CKD stage 3 due to type 2 diabetes mellitus (HCC)  Stable  Recommend that he avoid potential nephrotoxins such as NSAIDs and IV contrast  Discussed the importance of good blood pressure control and good glucose control    4. Long-term insulin use (HCC)  Patient is on long-term basal insulin therapy with a GLP-1 for type 2 diabetes management      Return in about 3 months (around 2/25/2021).      Thank you kindly for allowing me to participate in the diabetes care plan for this patient.    Espinoza Poole MD, [facility]  08/19/20    CC:   Tushar Stanley M.D.

## 2020-12-08 ENCOUNTER — NON-PROVIDER VISIT (OUTPATIENT)
Dept: HEALTH INFORMATION MANAGEMENT | Facility: MEDICAL CENTER | Age: 70
End: 2020-12-08
Payer: MEDICARE

## 2020-12-08 DIAGNOSIS — E11.65 TYPE 2 DIABETES MELLITUS WITH HYPERGLYCEMIA, WITH LONG-TERM CURRENT USE OF INSULIN (HCC): ICD-10-CM

## 2020-12-08 DIAGNOSIS — Z79.4 TYPE 2 DIABETES MELLITUS WITH HYPERGLYCEMIA, WITH LONG-TERM CURRENT USE OF INSULIN (HCC): ICD-10-CM

## 2020-12-08 PROCEDURE — G0109 DIAB MANAGE TRN IND/GROUP: HCPCS | Performed by: INTERNAL MEDICINE

## 2020-12-08 NOTE — LETTER
December 8, 2020      Espinoza Poole M.D.  [address]  [address]  [place] [id number]      RE: Kaveh Mayfield  [id number]    Dear:Espinoza Poole M.D.    The above referenced patient received 1.5 hours of diabetes education from [facility].    Topics taught (may include but not limited to):  Introduction to diabetes, benefits and responsibilities of patient, physiology of diabetes and the diease process, benefits of blood glucose monitoring and record keeping, medication action and possible side effects, hypoglycemia, sick day management, exercise, stress reduction and travel with diabetes.     HbA1c:   Lab Results   Component Value Date/Time    HBA1C 8.5 (A) 08/19/2020 12:00 PM      Patient/caregiver appeared to understand the content as demonstrated by appropriate questions.     Notes  He states he was diagnosed with diabetes 5 years ago but had issues with fasting sugars for years prior.  States he would fast for several days prior to physical for work so his blood sugars would be in acceptable range.   He is currently testing his blood sugars one time per day fasting and states they can range from 140-240.  Of note he often will have several pieces of fruit as a bedtime snack.   Medications: currently on Glipizide 10 mg.  Has been taking at hs, but will start taking with his first meal of the day.   Ozempic: was supposed to be on a dose of 0.5 but really on counting 5 clicks on pen and not even taken minimum dose of 0.25.  He was instructed on how to correctly use the pen.  He will start using 0.25 mg per weeks for the next couple of weeks and then increase his dose to the 0.5 mg.   Tresiba: 22 units first thing in the morning.   Activity: decreased due to weather and covid restrictions.   :    The patient was provided with written materials to back-up verbal education.   Thank you for this consultation,     Rocio Herring R.N., CDE  Certified Diabetes Nurse  Educator

## 2020-12-08 NOTE — PROGRESS NOTES
Kaveh attended the Diabetes Self Management Class today.  He states he was diagnosed with diabetes 5 years ago but had issues with fasting sugars for years prior.  States he would fast for several days prior to physical for work so his blood sugars would be in acceptable range.   He is currently testing his blood sugars one time per day fasting and states they can range from 140-240.  Of note he often will have several pieces of fruit as a bedtime snack.   Medications: currently on Glipizide 10 mg.  Has been taking at hs, but will start taking with his first meal of the day.   Ozempic: was supposed to be on a dose of 0.5 but really on counting 5 clicks on pen and not even taken minimum dose of 0.25.  He was instructed on how to correctly use the pen.  He will start using 0.25 mg per weeks for the next couple of weeks and then increase his dose to the 0.5 mg.   Tresiba: 22 units first thing in the morning.   Activity: decreased due to weather and covid restrictions.      Diabetes Education Content    Introduction To Diabetes   Define Type 2 diabetes: Education taught  Define Type 1 diabetes: Education taught  Understand feaures and benefits of education and management: Education taught  Describe who is responsible for diabetes management: Education taught  Describe impact of diabetes on family/friends: Education taught  Discuss role of significant other in management: Education taught  Diabetes Lifestyle Changes / Goals  State benefits of making appropriate lifestyle changes: Education taught  Identify lifestyle behaviors participant wants to change: Education taught  Identify risk factors that interfere with health and strategies to reduce : Education taught  Verbalize need for and frequency of health care follow-up: Education taught  Develop behavioral objectives and expected health outcomes: Education taught  Diabetes Exercise and Activity  Describe role of exercise in diabetes management: Education taught  State  "relationship of exercise to blood sugar: Education taught  State the benefits/risk(s) of exercise and precautions to follow: Education taught  Diabetes Self Blood Glucose Monitoring  Discuss rationale and importance of SBGM: Education taught  Discuss appropriate record keeping: Education taught  Discuss how to use results from blood glucose testing: Education taught  Evaluation and interpretation of blood glucose patterns: Education taught  Diabetes Disease Process  Discuss signs, symptoms, TX and prevention of hyperglycemia: Education taught  Discuss beta cell dysfunction and insulin resistance: Education taught  Discuss Insulin and its role in the body: Education taught  Discuss the role of the liver in glucose metabolism: Education taught  Discuss hormonal regulation: Education taught  Define benefits of good control and discuss what it means to be in \"good control\": Education taught  Discuss impact of exercise, food, meds, stress, and special factors on diabetes: Education taught  Discuss when to confer with HCP for possible treatment plan adjustments: Education taught  Diabetes Insulin and Medications  Action of oral medications, onset and duration: Education taught  Discuss incretin secretagogs and their use in diabetes management: Education taught  Identify the onset, peak and duration of different insulin: Education taught  Discuss proper injection technique and site rotation: Education taught  Discuss storage of insulin and disposal of sharps: Education taught  Hypoglycemia  List signs, symptoms and causes of hypoglycemia: Education taught  Discuss physiology of hypoglycemic reactions: Education taught  Accurately describe appropriate treatment and prevention: Education taught  Discuss when to contact HCP: Education taught  Sick Day Care  Verbalize important items to monitor when sick and when to contact HCP: Education taught  Review sick day box: Education taught  State diabetes medication adjustments on " sick days: Education taught  Complications (Chronic)  Explain prevention, TX, signs/symptoms of: retinopathy, neuropathy, nephropathy, infections: Education taught  Explain prevention, TX, signs/symptoms of: CAD, cerebral-vascular disease and sexual dysfunction: Education taught  Identify when to notify HCP of complications: Education taught  State principles of skin, dental and foot care.  Discuss proper foot care, prevention of foot probelms when to notify HCP>: Education taught  Demonstrate how to examine feet and what to look for: Education taught  Discuss immunizations: Education taught  Psychosocial adjustment  Identify sources of stress: Education taught  Identify resources and techniques for stress reduction: Education taught  Discuss health care referral network / community resources for support: Education taught  Define proper precautions to use when traveling: Education taught     Define proper precautions to use for emergency preparedness: Education taught  Discuss emergency preparedness with diabetes: Education taught

## 2020-12-09 ENCOUNTER — NON-PROVIDER VISIT (OUTPATIENT)
Dept: HEALTH INFORMATION MANAGEMENT | Facility: MEDICAL CENTER | Age: 70
End: 2020-12-09
Payer: MEDICARE

## 2020-12-09 DIAGNOSIS — E11.65 TYPE 2 DIABETES MELLITUS WITH HYPERGLYCEMIA, WITH LONG-TERM CURRENT USE OF INSULIN (HCC): ICD-10-CM

## 2020-12-09 DIAGNOSIS — Z79.4 TYPE 2 DIABETES MELLITUS WITH HYPERGLYCEMIA, WITH LONG-TERM CURRENT USE OF INSULIN (HCC): ICD-10-CM

## 2020-12-09 PROCEDURE — G0109 DIAB MANAGE TRN IND/GROUP: HCPCS | Performed by: INTERNAL MEDICINE

## 2020-12-09 NOTE — LETTER
No referring provider defined for this encounter.               December 9, 2020    Dear:No ref. provider found      Your patient Kaveh Mayfield 1950 was recently [facility] for nutrition counseling, regarding ***.      Should you desire any notes from this visit please do not hesitate to give us a call at [phone].      TYPE 2 CLASS    Certified Diabetes Educator

## 2020-12-09 NOTE — LETTER
Dr. Poole,    Your patient, Kaveh Mayfield, was seen on 12/9/20 for 2 hours regarding nutrition/exercise recommendations for diabetes as part of day 2 of our Type 2 diabetes management class.  They were taught the following information:     -The basics of nutrition and how foods affect blood sugar   -The plate method for portion control (¼ plate starch, ¼ plate protein, ½ plate non-starchy vegetables)  -Appropriate snacking recommendations  -Heart-healthy eating, including controlling/managing/improving hyperlipidemia and HTN  -Food label reading, including CHO counting  -Exercise recommendations of at least 150 minutes/week of moderate-intensity exercise on a minimum of 3 days each week    The pt was encouraged to use community resources to help improve their glycemic control and given a number of resources available to them. They were also given our phone number in the case of any question that may arise and to schedule additional visits if they desire a 1:1 appointment with the dietitian.     Thank you for your referral for this patient.  Feel free to contact us with any questions you may have at [phone].    Sincerely,    Codi Velasquez RD, LD  Outpatient Dietary Educator  [facility]        TYPE 2 CLASS    Certified Diabetes Educator         08-Oct-2017

## 2020-12-09 NOTE — PROGRESS NOTES
12/9/2020                              Tushar Stanley M.D.         52 y.o.              Time in/out: 9-11:07am    Subjective:  -Here for day 2 Nutrition part of Type 2 Diabetes class     Nutrition Diagnosis (PES Statement)  · Altered nutrition related lab values related to endocrine dysfunction as evidenced by HgbA1c of 8.5    Client history:  Condition(s) associated with a diagnosis or treatment (specify): DM2, HTN, HLD, CKD3, overweight     Biochemical data, medical test and procedures  Lab Results   Component Value Date/Time    HBA1C 8.5 (A) 08/19/2020 12:00 PM   @  No results found for: POCGLUCOSE  No results found for: CHOLSTRLTOT, LDL, HDL, TRIGLYCERIDE      Nutrition Intervention    Comprehensive Nutrition education Instruction or training leading to in-depth nutrition related knowledge about:  Benefits to following meal plan, Combine carb, protein and fat at each meal, Eating out, Fast food, Meal timing and spacing, Menu Planning, Metabolism of carb, protein, fat, Physical activity/exercise, Portion control, Sweets and alcohol in moderation, Heart-healthy guidelines, Label Reading, Handouts provided regarding topics discussed and Theraputic diet for Type 2 DM    Monitoring & Evaluation Plan    Behavioral-Environmental:  Behavior:  Consistent CHO intake throughout the day  Physical activity:  Increase as tolerated    Food / Nutrient Intake:  Food intake: Use the plate method recommendations for portions/balance at meals  Fluid/Beverage intake: Avoid all sweetened beverages unless utilizing to treat for low blood sugar   Macronutrient intake:  Up to 30-45 grams CHO with meals for women & 45-60 grams CHO for men, up to 15 grams CHO with snacks; protein control for CKD3, 4-5 oz protein per meal (pt will be having 2 meals/day) and ~1 oz per snack (pt will be having 2-3 snacks per day)    Physical Signs / Symptoms:  HbA1c profiles:  Within ADA guidelines or per pt's endocrinologist       Assessment  Notes:  Kaveh attended day 2 of the Type 2 diabetes class today.  The pt was taught that exercise works as a medication for controlling DM.  Different exercises were shown that can be done easily at home, like walking in place, lifting light weights while sitting, etc.  It was emphasized that if exercise is a consistent part of the pt's habits that DM control will be the most ideal it can be.   Food was then discussed next, with a brief review of the patient's current eating habits. Nutrition basics was reviewed and they were taught the differences between CHO/protein/fat and their effects on BG's.  This information was related to the plate method to help with meal planning/portions at meals; the pt was also taught to use their hands as measuring tools (fist for starch, palm for protein) to help when eating out or on a large plate.  Non-starchy vegetables were emphasized as foods that will help satisfy without raising BG's at meals and snacks.  I stressed to the patient to not go more than 4 hours without eating and if a snack is necessary they were taught how to construct a proper snack of ~15 grams CHO and ~7 grams protein. They were instructed that non-starchy vegetables and protein are option for between meals as well if not trying to avoid low blood sugar. We discussed appropriate portions of protein at meals and snacks to help preserve kidney function as well as aid in BG control. Finally, we moved onto the food label and how to effectively read a label to evaluate a food.  An alternative way of meal planning was given by using the food label and CHO counting; the patient can eat up to 60 grams CHO at meals and up to 15 grams CHO at snacks, if needed. They were encouraged to adjust CHO intake according to activity level and blood sugar readings. Lastly, the pt set goals to try to achieve in the next 3 months to help with DM control and may follow-up with me, if needed, for a more intensive meal plan and CHO  counting education.  F/u prn.

## 2020-12-16 DIAGNOSIS — E11.65 TYPE 2 DIABETES MELLITUS WITH HYPERGLYCEMIA, WITH LONG-TERM CURRENT USE OF INSULIN (HCC): ICD-10-CM

## 2020-12-16 DIAGNOSIS — Z79.4 TYPE 2 DIABETES MELLITUS WITH HYPERGLYCEMIA, WITH LONG-TERM CURRENT USE OF INSULIN (HCC): ICD-10-CM

## 2020-12-16 RX ORDER — EXENATIDE 2 MG/.85ML
1 INJECTION, SUSPENSION, EXTENDED RELEASE SUBCUTANEOUS
Qty: 4 EACH | Refills: 11 | Status: SHIPPED | OUTPATIENT
Start: 2020-12-16 | End: 2021-03-01

## 2021-01-18 ENCOUNTER — HOSPITAL ENCOUNTER (OUTPATIENT)
Dept: HOSPITAL 8 - LAB | Age: 71
Discharge: HOME | End: 2021-01-18
Attending: UROLOGY
Payer: MEDICARE

## 2021-01-18 DIAGNOSIS — N40.0: Primary | ICD-10-CM

## 2021-01-18 PROCEDURE — G0103 PSA SCREENING: HCPCS

## 2021-01-18 PROCEDURE — 36415 COLL VENOUS BLD VENIPUNCTURE: CPT

## 2021-01-18 PROCEDURE — 84153 ASSAY OF PSA TOTAL: CPT

## 2021-03-01 ENCOUNTER — OFFICE VISIT (OUTPATIENT)
Dept: ENDOCRINOLOGY | Facility: MEDICAL CENTER | Age: 71
End: 2021-03-01
Attending: INTERNAL MEDICINE
Payer: MEDICARE

## 2021-03-01 VITALS
HEART RATE: 94 BPM | RESPIRATION RATE: 14 BRPM | OXYGEN SATURATION: 96 % | BODY MASS INDEX: 28.62 KG/M2 | HEIGHT: 75 IN | DIASTOLIC BLOOD PRESSURE: 62 MMHG | WEIGHT: 230.2 LBS | SYSTOLIC BLOOD PRESSURE: 110 MMHG

## 2021-03-01 DIAGNOSIS — E78.5 DYSLIPIDEMIA: ICD-10-CM

## 2021-03-01 DIAGNOSIS — Z79.4 TYPE 2 DIABETES MELLITUS WITH HYPERGLYCEMIA, WITH LONG-TERM CURRENT USE OF INSULIN (HCC): ICD-10-CM

## 2021-03-01 DIAGNOSIS — Z79.4 LONG-TERM INSULIN USE (HCC): ICD-10-CM

## 2021-03-01 DIAGNOSIS — N18.30 CKD STAGE 3 DUE TO TYPE 2 DIABETES MELLITUS (HCC): ICD-10-CM

## 2021-03-01 DIAGNOSIS — E11.22 CKD STAGE 3 DUE TO TYPE 2 DIABETES MELLITUS (HCC): ICD-10-CM

## 2021-03-01 DIAGNOSIS — E11.65 TYPE 2 DIABETES MELLITUS WITH HYPERGLYCEMIA, WITH LONG-TERM CURRENT USE OF INSULIN (HCC): ICD-10-CM

## 2021-03-01 LAB
HBA1C MFR BLD: 7.2 % (ref 0–5.6)
INT CON NEG: NEGATIVE
INT CON POS: POSITIVE

## 2021-03-01 PROCEDURE — 83036 HEMOGLOBIN GLYCOSYLATED A1C: CPT | Performed by: INTERNAL MEDICINE

## 2021-03-01 PROCEDURE — 99212 OFFICE O/P EST SF 10 MIN: CPT | Performed by: INTERNAL MEDICINE

## 2021-03-01 PROCEDURE — 99214 OFFICE O/P EST MOD 30 MIN: CPT | Performed by: INTERNAL MEDICINE

## 2021-03-01 RX ORDER — SEMAGLUTIDE 1.34 MG/ML
0.5 INJECTION, SOLUTION SUBCUTANEOUS
Qty: 1 EACH | Refills: 11 | Status: SHIPPED | OUTPATIENT
Start: 2021-03-01 | End: 2022-01-19 | Stop reason: CLARIF

## 2021-03-01 RX ORDER — CHOLECALCIFEROL (VITAMIN D3) 125 MCG
CAPSULE ORAL
COMMUNITY
Start: 2021-01-10 | End: 2021-10-20

## 2021-03-01 RX ORDER — EMPAGLIFLOZIN 10 MG/1
1 TABLET, FILM COATED ORAL DAILY
Qty: 30 TABLET | Refills: 11 | Status: SHIPPED | OUTPATIENT
Start: 2021-03-01 | End: 2021-10-20

## 2021-03-01 RX ORDER — INSULIN DEGLUDEC 100 U/ML
30 INJECTION, SOLUTION SUBCUTANEOUS
Qty: 15 ML | Refills: 11 | Status: SHIPPED | OUTPATIENT
Start: 2021-03-01 | End: 2021-10-20 | Stop reason: SDUPTHER

## 2021-03-01 RX ORDER — LISINOPRIL 5 MG/1
5 TABLET ORAL DAILY
Qty: 90 TABLET | Refills: 1
Start: 2021-03-01 | End: 2022-05-16 | Stop reason: SDUPTHER

## 2021-03-01 ASSESSMENT — PATIENT HEALTH QUESTIONNAIRE - PHQ9: CLINICAL INTERPRETATION OF PHQ2 SCORE: 0

## 2021-03-01 NOTE — PROGRESS NOTES
CHIEF COMPLAINT: Patient is here for follow up of Type 2 Diabetes Mellitus. Patient was presented for a telehealth consultation via secure and encrypted videoconferencing technology. This encounter was conducted via Zoom . Verbal consent was obtained. Patient's identity was verified.    HPI:     Kaveh Mayfield is a 69 y.o. male with Type 2 Diabetes Mellitus here for follow up.    Labs from 3/1/31856 show a1c is 7.2% (6.7% in [facility] labs)   Labs from 9/23/2020 show a1c was 8.0% ([facility] labs)  Labs from 8/19/2020 HbA1c is improved at 8.5%  His baseline A1c was 10% at [facility] back in May 2020  Most of his labs are done at an outside facility      I first saw him as a consult on July 2020 for uncontrolled diabetes.  He has a history of coronary artery disease, chronic kidney disease st III and obesity  After his initial consultation visit I started him on a GLP-1.  Unfortunately because of his chronic kidney disease he was not a candidate for an SGLT2 inhibitor  (low GFR)      On follow-up he is taking Tresiba 22 units daily, Ozempic 0.5 mg once a week and glipizide XL 10 mg daily    He reports improved compliance with his medications  He did go to the diabetes class which has helped him a lot  He reports less hyperglycemia        He has mixed hyperlipidemia at baseline and is taking pravastatin and fenofibrate  He is tolerating both medications well  He denies muscle aches and cramps  LDL-C was 93 on 11/2020 ( see outside labs from [facility])          Regards his chronic kidney disease He is doing better  His creatinine 1.7 with a GFR of 39  with outside labs from [facility] on Feb 2021  He is being followed by nephrologist Dr. Felder at [place]  I do not have his current clinic notes from his nephrologist on hand  He is on a low-dose ACE inhibitor, lisinopril 5 mg daily and amlodipine 10 mg daily  He does have microalbuminuria on his most recent labs from [facility] on  February 2021        He is also on sotalol because of paroxysmal A. fib  He has hypogonadism and is on testosterone injections but this is being managed by another physician      BG Diary:  Breakfast: 88, 100, 102    Weight has been stable    Diabetes Complications   Retinopathy: No known retinopathy.  Last eye exam: We are requesting records of his eye exam  Neuropathy: Denies paresthesias or numbness in hands or feet. Denies any foot wounds.  Exercise: Minimal.  Diet: Fair.  Patient's medications, allergies, and social histories were reviewed and updated as appropriate.    ROS:     CONS:     No fever, no chills   EYES:     No diplopia, no blurry vision   CV:           No chest pain, no palpitations   PULM:     No SOB, no cough, no hemoptysis.   GI:            No nausea, no vomiting, no diarrhea, no constipation   ENDO:     No polyuria, no polydipsia, no heat intolerance, no cold intolerance       Past Medical History:  Problem List:  2020-11: CKD stage 3 due to type 2 diabetes mellitus ([facility])  2020-06: Type 2 diabetes mellitus with hyperglycemia, with long-term   current use of insulin ([facility])  2020-06: Dyslipidemia  2020-06: CKD stage 3 due to type 1 diabetes mellitus ([facility])  2020-06: Long-term insulin use ([facility])  2011-06: CAD (coronary artery disease)  2011-06: A-fib ([facility])  2011-06: HTN (hypertension)  2011-06: Elevated lipids      Past Surgical History:  Past Surgical History:   Procedure Laterality Date   • SHOULDER ARTHROSCOPY W/ ROTATOR CUFF REPAIR  8/7/2013    Performed by Mickey Liu M.D. at [facility]   • CLAVICLE DISTAL EXCISION  8/7/2013    Performed by Mickey Liu M.D. at [facility]   • SHOULDER DECOMPRESSION ARTHROSCOPIC  8/7/2013    Performed by Mickey Liu M.D. at [facility]   • ANGIOGRAM  2006    stent placement        Allergies:  Nkda [no known drug allergy]     Social History:  Social History     Tobacco Use   • Smoking status:  "Former Smoker     Packs/day: 3.00     Years: 32.00     Pack years: 96.00     Types: Cigarettes     Quit date: 2006     Years since quittin.9   • Smokeless tobacco: Never Used   Substance Use Topics   • Alcohol use: Yes     Alcohol/week: 1.5 oz     Types: 3 drink(s) per week   • Drug use: No        Family History:   family history includes Heart Disease in an other family member; Hypertension in an other family member; Stroke in an other family member.      PHYSICAL EXAM:   OBJECTIVE:  Vital signs: /62 (BP Location: Left arm, Patient Position: Sitting, BP Cuff Size: Adult)   Pulse 94   Resp 14   Ht 1.905 m (6' 3\")   Wt 104 kg (230 lb 3.2 oz)   SpO2 96%   BMI 28.77 kg/m²   GENERAL: Well-developed, well-nourished in no apparent distress.   EYE:  No ocular asymmetry, PERRLA  HENT: Pink, moist mucous membranes.    NECK: No thyromegaly.   CARDIOVASCULAR:  No murmurs  LUNGS: Clear breath sounds  ABDOMEN: Soft, nontender   EXTREMITIES: No clubbing, cyanosis, or edema.   NEUROLOGICAL: No gross focal motor abnormalities   LYMPH: No cervical adenopathy palpated.   SKIN: No rashes, lesions.   Monofilament testing with a 10 gram force: sensation: intact bilaterally  Visual Inspection: Feet without maceration, ulcers, or fissures.  Pedal pulses: intact bilaterally    Labs:  Lab Results   Component Value Date/Time    HBA1C 8.5 (A) 2020 12:00 PM        Lab Results   Component Value Date/Time    WBC 8.9 10/17/2016 09:46 PM    RBC 4.52 (L) 10/17/2016 09:46 PM    HEMOGLOBIN 15.1 10/17/2016 09:46 PM    .5 (H) 10/17/2016 09:46 PM    MCH 33.4 (H) 10/17/2016 09:46 PM    MCHC 32.9 (L) 10/17/2016 09:46 PM    RDW 70.4 (H) 10/17/2016 09:46 PM    MPV 11.1 10/17/2016 09:46 PM       Lab Results   Component Value Date/Time    SODIUM 131 (L) 10/17/2016 09:46 PM    POTASSIUM 4.8 10/17/2016 09:46 PM    CHLORIDE 97 10/17/2016 09:46 PM    CO2 23 10/17/2016 09:46 PM    ANION 11.0 10/17/2016 09:46 PM    GLUCOSE 200 (H) " 10/17/2016 09:46 PM    BUN 19 10/17/2016 09:46 PM    CREATININE 1.58 (H) 10/17/2016 09:46 PM    CREATININE 0.9 04/27/2006 03:25 AM    CALCIUM 9.8 10/17/2016 09:46 PM    ASTSGOT 46 (H) 10/17/2016 09:46 PM    ALTSGPT 29 10/17/2016 09:46 PM    TBILIRUBIN 1.0 10/17/2016 09:46 PM    ALBUMIN 4.6 10/17/2016 09:46 PM    TOTPROTEIN 7.7 10/17/2016 09:46 PM    GLOBULIN 3.1 10/17/2016 09:46 PM    AGRATIO 1.5 10/17/2016 09:46 PM       No results found for: CHOLSTRLTOT, TRIGLYCERIDE, HDL, LDL, CHOLHDLRAT, NONHDL    No results found for: MICROALBCALC, MALBCRT, MALBEXCR, CNHDMB57, MICROALBUR, MICRALB, UMICROALBUM, MICROALBTIM     No results found for: TSHULTRASEN  No results found for: FREEDIR  No results found for: FREET3  No results found for: THYSTIMIG        ASSESSMENT/PLAN:     1. Type 2 diabetes mellitus with hyperglycemia, with long-term current use of insulin (HCC)  Improved control  A1c is down to 7%  Continue Tresiba 22 units daily  Continue Ozempic 0.5 mg weekly  Stop glipizide  Start Jardiance 10 mg daily  I recommend that he watch his carb intake  I recommend regular exercise  I recommend that he remain well-hydrated  We will plan for follow-up in 3 months with a repeat of his A1c      2. Dyslipidemia  Controlled  Continue pravastatin and fenofibrate  Repeat fasting lipids in 6 to 12 months    3. 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 Long-term insulin use (HCC)  Patient is on long-term basal insulin therapy with a GLP-1 for type 2 diabetes management      Return in about 3 months (around 6/1/2021).      Thank you kindly for allowing me to participate in the diabetes care plan for this patient.    Espinoza Poole MD, JIM, [facility]  08/19/20    CC:   Tushar Stanley M.D.

## 2021-03-31 ENCOUNTER — TELEPHONE (OUTPATIENT)
Dept: ENDOCRINOLOGY | Facility: MEDICAL CENTER | Age: 71
End: 2021-03-31

## 2021-04-07 ENCOUNTER — TELEPHONE (OUTPATIENT)
Dept: ENDOCRINOLOGY | Facility: MEDICAL CENTER | Age: 71
End: 2021-04-07

## 2021-04-07 NOTE — TELEPHONE ENCOUNTER
VOICEMAIL  1. Caller Name: Kaveh Mayfield                        Call Back Number: [phone]      2. Message: Patient called and left a message yesterday 04/06/21 because he needs some samples of Jardiance. Patient would like a call back if he could come get them.     3. Patient approves office to leave a detailed voicemail/MyChart message: yes

## 2021-04-12 NOTE — TELEPHONE ENCOUNTER
Phone Number Called: Kaveh Mayfield      Call outcome: Spoke with Patient    Message: Contacted patient in regards to picking up samples. Patient stated he picked up sample from [facility] on Friday. He thanked me for the call.

## 2021-09-08 ENCOUNTER — TELEPHONE (OUTPATIENT)
Dept: ENDOCRINOLOGY | Facility: MEDICAL CENTER | Age: 71
End: 2021-09-08

## 2021-09-08 NOTE — TELEPHONE ENCOUNTER
Patient called requesting samples of his Jardiance 10 mg, Tresiba Flextouch 100U and Ozempic.      Dr. Poole has been supplying him with 30 days of samples since he is in the gap and also just above the income cut off for manufacture programs.     Are we okay to give him another month of samples?

## 2021-10-05 ENCOUNTER — TELEPHONE (OUTPATIENT)
Dept: ENDOCRINOLOGY | Facility: MEDICAL CENTER | Age: 71
End: 2021-10-05

## 2021-10-05 NOTE — TELEPHONE ENCOUNTER
Lucy Poole,  Patient called me and is wondering if he can come get another month of samples for Jardiance and Tresiba?    Already screened for manufacture assistance and he is over income.    Thanks,  Kourtney

## 2021-10-20 ENCOUNTER — HOSPITAL ENCOUNTER (OUTPATIENT)
Dept: LAB | Facility: MEDICAL CENTER | Age: 71
End: 2021-10-20
Attending: INTERNAL MEDICINE
Payer: MEDICARE

## 2021-10-20 ENCOUNTER — OFFICE VISIT (OUTPATIENT)
Dept: ENDOCRINOLOGY | Facility: MEDICAL CENTER | Age: 71
End: 2021-10-20
Attending: INTERNAL MEDICINE
Payer: MEDICARE

## 2021-10-20 VITALS
DIASTOLIC BLOOD PRESSURE: 62 MMHG | BODY MASS INDEX: 27.72 KG/M2 | HEIGHT: 75 IN | HEART RATE: 80 BPM | RESPIRATION RATE: 16 BRPM | WEIGHT: 222.9 LBS | SYSTOLIC BLOOD PRESSURE: 106 MMHG | OXYGEN SATURATION: 93 %

## 2021-10-20 DIAGNOSIS — N18.30 CKD STAGE 3 DUE TO TYPE 2 DIABETES MELLITUS (HCC): ICD-10-CM

## 2021-10-20 DIAGNOSIS — Z79.4 TYPE 2 DIABETES MELLITUS WITH HYPERGLYCEMIA, WITH LONG-TERM CURRENT USE OF INSULIN (HCC): ICD-10-CM

## 2021-10-20 DIAGNOSIS — E11.22 CKD STAGE 3 DUE TO TYPE 2 DIABETES MELLITUS (HCC): ICD-10-CM

## 2021-10-20 DIAGNOSIS — E78.5 DYSLIPIDEMIA: ICD-10-CM

## 2021-10-20 DIAGNOSIS — Z79.4 LONG-TERM INSULIN USE (HCC): ICD-10-CM

## 2021-10-20 DIAGNOSIS — E11.65 TYPE 2 DIABETES MELLITUS WITH HYPERGLYCEMIA, WITH LONG-TERM CURRENT USE OF INSULIN (HCC): ICD-10-CM

## 2021-10-20 LAB
HBA1C MFR BLD: 7.9 % (ref 0–5.6)
INT CON NEG: ABNORMAL
INT CON POS: ABNORMAL

## 2021-10-20 PROCEDURE — 99214 OFFICE O/P EST MOD 30 MIN: CPT | Performed by: INTERNAL MEDICINE

## 2021-10-20 PROCEDURE — 99212 OFFICE O/P EST SF 10 MIN: CPT | Performed by: INTERNAL MEDICINE

## 2021-10-20 PROCEDURE — 83036 HEMOGLOBIN GLYCOSYLATED A1C: CPT | Performed by: INTERNAL MEDICINE

## 2021-10-20 RX ORDER — EMPAGLIFLOZIN 25 MG/1
1 TABLET, FILM COATED ORAL DAILY
Qty: 30 TABLET | Refills: 11 | Status: SHIPPED | OUTPATIENT
Start: 2021-10-20 | End: 2022-01-05 | Stop reason: SDUPTHER

## 2021-10-20 RX ORDER — IBUPROFEN 600 MG/1
600 TABLET ORAL EVERY 6 HOURS PRN
COMMUNITY
Start: 2021-09-09

## 2021-10-20 RX ORDER — CHLORHEXIDINE GLUCONATE ORAL RINSE 1.2 MG/ML
SOLUTION DENTAL
COMMUNITY
Start: 2021-09-09 | End: 2021-10-20

## 2021-10-20 RX ORDER — HYDROCODONE BITARTRATE AND ACETAMINOPHEN 5; 325 MG/1; MG/1
1 TABLET ORAL
COMMUNITY
Start: 2021-09-09 | End: 2021-10-20

## 2021-10-20 RX ORDER — INSULIN DEGLUDEC 100 U/ML
30 INJECTION, SOLUTION SUBCUTANEOUS
Qty: 15 ML | Refills: 11 | Status: SHIPPED | OUTPATIENT
Start: 2021-10-20 | End: 2022-01-05 | Stop reason: SDUPTHER

## 2021-10-20 NOTE — PROGRESS NOTES
CHIEF COMPLAINT: Patient is here for follow up of Type 2 Diabetes Mellitus.     HPI:     Kaveh Mayfield is a 69 y.o. male with Type 2 Diabetes Mellitus here for follow up.    Labs from October 20, 2021 show A1c is 7.9%  Labs from 3/1/96275 show a1c was 7.2% (6.7% in [facility] labs)   Labs from 9/23/2020 show a1c was 8.0% ([facility] labs)  Labs from 8/19/2020 HbA1c is improved at 8.5%  His baseline A1c was 10% at [facility] back in May 2020  Most of his labs are done at an outside facility      I first saw him as a consult on July 2020 for uncontrolled diabetes.   He has a history of coronary artery disease, chronic kidney disease st III and obesity  After his initial consultation visit I started him on a GLP-1 and later on added an SGLT2 inhibitor.    Unfortunately most of his labs are done at [facility]  He also cannot afford a lot of his medications  Unfortunately he did not qualify for patient assistance programs      On follow-up he is taking Tresiba 22 units daily, Ozempic 0.25 mg once a week, Jardiance 10mg daily    He cant tolerate high doses of ozempic  He forgot his glucose meter despite repeated request to do so  We talked about increasing his Jardiance today and his basal insulin      He has mixed hyperlipidemia at baseline and is taking pravastatin and fenofibrate  He is tolerating both medications well  He denies muscle aches and cramps  LDL-C was 93 on 11/2020 ( see outside labs from [facility])  We do not have an updated lipid panel on hand        Regards his chronic kidney disease this is stable.  His serum creatinine stable at 1.7 from outside labs at [facility] October 2021  He is being followed by nephrologist Dr. Felder at [place]  I do not have his current clinic notes from his nephrologist on hand  He is on a low-dose ACE inhibitor, lisinopril 5 mg daily and amlodipine 10 mg daily  He does have microalbuminuria on his most recent labs from [facility]  on February 2021        He is also on sotalol because of paroxysmal A. fib  He has hypogonadism and is on testosterone injections but this is being managed by another physician      BG Diary:  Patient did not bring meter    Weight has been stable    Diabetes Complications   Retinopathy: No known retinopathy.  Last eye exam: We are requesting records of his eye exam  Neuropathy: Denies paresthesias or numbness in hands or feet. Denies any foot wounds.  Exercise: Minimal.  Diet: Fair.  Patient's medications, allergies, and social histories were reviewed and updated as appropriate.    ROS:     CONS:     No fever, no chills   EYES:     No diplopia, no blurry vision   CV:           No chest pain, no palpitations   PULM:     No SOB, no cough, no hemoptysis.   GI:            No nausea, no vomiting, no diarrhea, no constipation   ENDO:     No polyuria, no polydipsia, no heat intolerance, no cold intolerance       Past Medical History:  Problem List:  2020-11: CKD stage 3 due to type 2 diabetes mellitus ([facility])  2020-06: Type 2 diabetes mellitus with hyperglycemia, with long-term   current use of insulin ([facility])  2020-06: Dyslipidemia  2020-06: CKD stage 3 due to type 1 diabetes mellitus ([facility])  2020-06: Long-term insulin use ([facility])  2011-06: CAD (coronary artery disease)  2011-06: A-fib ([facility])  2011-06: HTN (hypertension)  2011-06: Elevated lipids      Past Surgical History:  Past Surgical History:   Procedure Laterality Date   • SHOULDER ARTHROSCOPY W/ ROTATOR CUFF REPAIR  8/7/2013    Performed by Mickey Liu M.D. at [facility]   • CLAVICLE DISTAL EXCISION  8/7/2013    Performed by Mickey Liu M.D. at [facility]   • SHOULDER DECOMPRESSION ARTHROSCOPIC  8/7/2013    Performed by Mickey Liu M.D. at [facility]   • ANGIOGRAM  2006    stent placement        Allergies:  Nkda [no known drug allergy]     Social History:  Social History     Tobacco Use   • Smoking  "status: Former Smoker     Packs/day: 3.00     Years: 32.00     Pack years: 96.00     Types: Cigarettes     Quit date: 4/1/2006     Years since quitting: 15.5   • Smokeless tobacco: Never Used   Vaping Use   • Vaping Use: Never used   Substance Use Topics   • Alcohol use: Yes     Alcohol/week: 1.5 oz     Types: 3 drink(s) per week   • Drug use: No        Family History:   family history includes Heart Disease in an other family member; Hypertension in an other family member; Stroke in an other family member.      PHYSICAL EXAM:   OBJECTIVE:  Vital signs: /62 (BP Location: Left arm, Patient Position: Sitting, BP Cuff Size: Adult)   Pulse 80   Resp 16   Ht 1.905 m (6' 3\")   Wt 101 kg (222 lb 14.4 oz)   SpO2 93%   BMI 27.86 kg/m²   GENERAL: Well-developed, well-nourished in no apparent distress.   EYE:  No ocular asymmetry, PERRLA  HENT: Pink, moist mucous membranes.    NECK: No thyromegaly.   CARDIOVASCULAR:  No murmurs  LUNGS: Clear breath sounds  ABDOMEN: Soft, nontender   EXTREMITIES: No clubbing, cyanosis, or edema.   NEUROLOGICAL: No gross focal motor abnormalities   LYMPH: No cervical adenopathy palpated.   SKIN: No rashes, lesions.       Labs:  Lab Results   Component Value Date/Time    HBA1C 7.9 (A) 10/20/2021 03:26 PM        Lab Results   Component Value Date/Time    WBC 8.9 10/17/2016 09:46 PM    RBC 4.52 (L) 10/17/2016 09:46 PM    HEMOGLOBIN 15.1 10/17/2016 09:46 PM    .5 (H) 10/17/2016 09:46 PM    MCH 33.4 (H) 10/17/2016 09:46 PM    MCHC 32.9 (L) 10/17/2016 09:46 PM    RDW 70.4 (H) 10/17/2016 09:46 PM    MPV 11.1 10/17/2016 09:46 PM       Lab Results   Component Value Date/Time    SODIUM 131 (L) 10/17/2016 09:46 PM    POTASSIUM 4.8 10/17/2016 09:46 PM    CHLORIDE 97 10/17/2016 09:46 PM    CO2 23 10/17/2016 09:46 PM    ANION 11.0 10/17/2016 09:46 PM    GLUCOSE 200 (H) 10/17/2016 09:46 PM    BUN 19 10/17/2016 09:46 PM    CREATININE 1.58 (H) 10/17/2016 09:46 PM    CREATININE 0.9 04/27/2006 " 03:25 AM    CALCIUM 9.8 10/17/2016 09:46 PM    ASTSGOT 46 (H) 10/17/2016 09:46 PM    ALTSGPT 29 10/17/2016 09:46 PM    TBILIRUBIN 1.0 10/17/2016 09:46 PM    ALBUMIN 4.6 10/17/2016 09:46 PM    TOTPROTEIN 7.7 10/17/2016 09:46 PM    GLOBULIN 3.1 10/17/2016 09:46 PM    AGRATIO 1.5 10/17/2016 09:46 PM       No results found for: CHOLSTRLTOT, TRIGLYCERIDE, HDL, LDL, CHOLHDLRAT, NONHDL    No results found for: MICROALBCALC, MALBCRT, MALBEXCR, AIOBTQ06, MICROALBUR, MICRALB, UMICROALBUM, MICROALBTIM     No results found for: TSHULTRASEN  No results found for: FREEDIR  No results found for: FREET3  No results found for: THYSTIMIG        ASSESSMENT/PLAN:     1. Type 2 diabetes mellitus with hyperglycemia, with long-term current use of insulin (HCC)  Improved control  A1c is down to 7%  Increase Tresiba to 28 units daily  Increase Jardiance to 25 mg daily  Continue Ozempic 0.25 mg weekly  I need him to get labs today   follow-up in 3 months      2. Dyslipidemia  Controlled  Continue pravastatin and fenofibrate  Repeat fasting lipids in 3 months    3. 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 Long-term insulin use (HCC)  Patient is on long-term basal insulin therapy with a GLP-1 for type 2 diabetes management      Return in about 3 months (around 1/20/2022).      Thank you kindly for allowing me to participate in the diabetes care plan for this patient.    Espinoza Poole MD, FACE, [facility]  08/19/20    CC:   Tushar Stanley M.D.

## 2021-12-14 ENCOUNTER — TELEPHONE (OUTPATIENT)
Dept: ENDOCRINOLOGY | Facility: MEDICAL CENTER | Age: 71
End: 2021-12-14

## 2021-12-17 LAB
CHOLEST SERPL-MCNC: 129 MG/DL
CREAT SERPL-MCNC: 2.01 MG/DL
HDLC SERPL-MCNC: 28 MG/DL
LDLC SERPL CALC-MCNC: 67 MG/DL
MICROAL CRT RATIO 4634: 21.4
TRIGL SERPL-MCNC: 170 MG/DL

## 2022-01-05 DIAGNOSIS — Z79.4 TYPE 2 DIABETES MELLITUS WITH HYPERGLYCEMIA, WITH LONG-TERM CURRENT USE OF INSULIN (HCC): ICD-10-CM

## 2022-01-05 DIAGNOSIS — E11.65 TYPE 2 DIABETES MELLITUS WITH HYPERGLYCEMIA, WITH LONG-TERM CURRENT USE OF INSULIN (HCC): ICD-10-CM

## 2022-01-05 RX ORDER — EMPAGLIFLOZIN 25 MG/1
1 TABLET, FILM COATED ORAL DAILY
Qty: 30 TABLET | Refills: 11 | Status: SHIPPED | OUTPATIENT
Start: 2022-01-05 | End: 2022-05-16 | Stop reason: SDUPTHER

## 2022-01-05 RX ORDER — INSULIN DEGLUDEC 100 U/ML
30 INJECTION, SOLUTION SUBCUTANEOUS
Qty: 15 ML | Refills: 11 | Status: SHIPPED | OUTPATIENT
Start: 2022-01-05 | End: 2022-05-16 | Stop reason: SDUPTHER

## 2022-01-19 ENCOUNTER — NON-PROVIDER VISIT (OUTPATIENT)
Dept: ENDOCRINOLOGY | Facility: MEDICAL CENTER | Age: 72
End: 2022-01-19
Attending: INTERNAL MEDICINE
Payer: MEDICARE

## 2022-01-19 VITALS
WEIGHT: 218 LBS | HEART RATE: 77 BPM | OXYGEN SATURATION: 95 % | DIASTOLIC BLOOD PRESSURE: 68 MMHG | HEIGHT: 75 IN | BODY MASS INDEX: 27.1 KG/M2 | SYSTOLIC BLOOD PRESSURE: 100 MMHG

## 2022-01-19 DIAGNOSIS — E78.5 DYSLIPIDEMIA: ICD-10-CM

## 2022-01-19 DIAGNOSIS — Z79.4 LONG-TERM INSULIN USE (HCC): ICD-10-CM

## 2022-01-19 DIAGNOSIS — Z79.4 TYPE 2 DIABETES MELLITUS WITH HYPERGLYCEMIA, WITH LONG-TERM CURRENT USE OF INSULIN (HCC): ICD-10-CM

## 2022-01-19 DIAGNOSIS — E11.65 TYPE 2 DIABETES MELLITUS WITH HYPERGLYCEMIA, WITH LONG-TERM CURRENT USE OF INSULIN (HCC): ICD-10-CM

## 2022-01-19 LAB
HBA1C MFR BLD: 8.1 % (ref 0–5.6)
INT CON NEG: ABNORMAL
INT CON POS: ABNORMAL

## 2022-01-19 PROCEDURE — 83036 HEMOGLOBIN GLYCOSYLATED A1C: CPT

## 2022-01-19 PROCEDURE — 99212 OFFICE O/P EST SF 10 MIN: CPT | Performed by: INTERNAL MEDICINE

## 2022-01-19 RX ORDER — DULAGLUTIDE 0.75 MG/.5ML
0.5 INJECTION, SOLUTION SUBCUTANEOUS
Qty: 2 ML | Refills: 6 | Status: SHIPPED | OUTPATIENT
Start: 2022-01-19 | End: 2022-05-16 | Stop reason: SDUPTHER

## 2022-01-19 NOTE — PROGRESS NOTES
"RN-CDE Note    Subjective:     HPI:  Kaveh Mayfield is a 71 y.o. old patient who is seen by the Diabetes Nurse Specialist today for review of his type 2 diabetes requiring insulin   Changes in Health: none      Diabetes Medications:   Tresiba 28 units daily  Ozempic 0.25 mg weekly  (complains of feeling sick with nausea and vomiting and diarrhea for several days after injections)  Jardiance 25 mg daily  Taking above medications as prescribed: yes  Taking daily ASA: Yes    Exercise:  trying to walk10-15 minutes per day  Diet: \"healthy\" diet  in general  Patient's body mass index is 27.25 kg/m². Exercise and nutrition counseling were performed at this visit.      Health Maintenance:   Health Maintenance Due   Topic Date Due   • Annual Wellness Visit  Never done   • HEPATITIS C SCREENING  Never done   • RETINAL SCREENING  Never done   • COLORECTAL CANCER SCREENING  Never done   • IMM ZOSTER VACCINES (1 of 2) Never done   • FASTING LIPID PROFILE  2021   • SERUM CREATININE  2022         DM:   Last A1c:   Lab Results   Component Value Date/Time    HBA1C 8.1 (A) 2022 11:41 AM      Previous A1c was 7.9 on 10/20/21  A1C GOAL: < 7    Glucose monitoring frequency: testing blood sugars every morning.   Fastin-155  Hypoglycemic episodes: yes - on occasion when he feels sick after GLP injection    Last Retinal Exam: states completed in 2021 at [facility]    Exam:  Monofilament: current, due 10/20/22    Lab Results   Component Value Date/Time    MICROALBCALC 21.40 2021 12:00 AM        ACR Albumin/Creatinine Ratio goal <30     HTN:   Blood pressure goal <140/<80 .   Currently Rx ACE/ARB: Yes     Dyslipidemia:    Lab Results   Component Value Date/Time    CHOLSTRLTOT 129 2021 12:00 AM    LDL 67 2021 12:00 AM    HDL 28 2021 12:00 AM    TRIGLYCERIDE 170 2021 12:00 AM         Currently Rx Statin: Yes     He  reports that he quit smoking about 15 years ago. His smoking use " included cigarettes. He has a 96.00 pack-year smoking history. He has never used smokeless tobacco.      Plan:     Discussed and educated on:   - All medications, side effects and compliance (discussed carefully)  - Annual eye examinations at [facility]  - HbA1C: target  - Home glucose monitoring emphasized  - Weight control and daily exercise    Recommended medication changes: switch Ozemic to Trulicity to see if he can tolerate better.

## 2022-05-16 ENCOUNTER — OFFICE VISIT (OUTPATIENT)
Dept: ENDOCRINOLOGY | Facility: MEDICAL CENTER | Age: 72
End: 2022-05-16
Attending: NURSE PRACTITIONER
Payer: MEDICARE

## 2022-05-16 VITALS
DIASTOLIC BLOOD PRESSURE: 70 MMHG | HEART RATE: 68 BPM | BODY MASS INDEX: 27.98 KG/M2 | WEIGHT: 225 LBS | OXYGEN SATURATION: 94 % | HEIGHT: 75 IN | SYSTOLIC BLOOD PRESSURE: 124 MMHG

## 2022-05-16 DIAGNOSIS — N18.30 CKD STAGE 3 DUE TO TYPE 2 DIABETES MELLITUS (HCC): ICD-10-CM

## 2022-05-16 DIAGNOSIS — E11.65 TYPE 2 DIABETES MELLITUS WITH HYPERGLYCEMIA, WITH LONG-TERM CURRENT USE OF INSULIN (HCC): ICD-10-CM

## 2022-05-16 DIAGNOSIS — E78.5 DYSLIPIDEMIA: ICD-10-CM

## 2022-05-16 DIAGNOSIS — Z79.4 LONG-TERM INSULIN USE (HCC): ICD-10-CM

## 2022-05-16 DIAGNOSIS — E78.1 HYPERTRIGLYCERIDEMIA: ICD-10-CM

## 2022-05-16 DIAGNOSIS — E11.22 CKD STAGE 3 DUE TO TYPE 2 DIABETES MELLITUS (HCC): ICD-10-CM

## 2022-05-16 DIAGNOSIS — Z79.4 TYPE 2 DIABETES MELLITUS WITH HYPERGLYCEMIA, WITH LONG-TERM CURRENT USE OF INSULIN (HCC): ICD-10-CM

## 2022-05-16 PROBLEM — G62.9 POLYNEUROPATHY: Status: ACTIVE | Noted: 2021-10-12

## 2022-05-16 PROBLEM — N28.9 RENAL INSUFFICIENCY SYNDROME: Status: ACTIVE | Noted: 2021-10-12

## 2022-05-16 PROBLEM — I25.10 CORONARY ARTERY DISEASE: Status: ACTIVE | Noted: 2021-10-12

## 2022-05-16 PROBLEM — E11.9 DIABETES MELLITUS, TYPE 2 (HCC): Status: ACTIVE | Noted: 2021-10-12

## 2022-05-16 PROBLEM — I48.91 ATRIAL FIBRILLATION (HCC): Status: ACTIVE | Noted: 2021-10-12

## 2022-05-16 PROBLEM — I10 ESSENTIAL (PRIMARY) HYPERTENSION: Status: ACTIVE | Noted: 2021-10-12

## 2022-05-16 LAB
HBA1C MFR BLD: 7.4 % (ref 0–5.6)
INT CON NEG: ABNORMAL
INT CON POS: ABNORMAL

## 2022-05-16 PROCEDURE — 83036 HEMOGLOBIN GLYCOSYLATED A1C: CPT | Performed by: NURSE PRACTITIONER

## 2022-05-16 PROCEDURE — 99213 OFFICE O/P EST LOW 20 MIN: CPT | Performed by: NURSE PRACTITIONER

## 2022-05-16 PROCEDURE — 99214 OFFICE O/P EST MOD 30 MIN: CPT | Performed by: NURSE PRACTITIONER

## 2022-05-16 RX ORDER — PRAVASTATIN SODIUM 40 MG
TABLET ORAL
Qty: 90 TABLET | Refills: 2 | Status: SHIPPED | OUTPATIENT
Start: 2022-05-16

## 2022-05-16 RX ORDER — FENOFIBRATE 160 MG/1
160 TABLET ORAL DAILY
COMMUNITY
End: 2022-11-18

## 2022-05-16 RX ORDER — AMLODIPINE BESYLATE 5 MG/1
2 TABLET ORAL
COMMUNITY
Start: 2022-03-01 | End: 2022-11-18

## 2022-05-16 RX ORDER — FENOFIBRATE 160 MG/1
160 TABLET ORAL DAILY
Qty: 90 TABLET | Refills: 2 | Status: SHIPPED | OUTPATIENT
Start: 2022-05-16 | End: 2023-02-24 | Stop reason: SDUPTHER

## 2022-05-16 RX ORDER — LISINOPRIL 5 MG/1
5 TABLET ORAL DAILY
Qty: 90 TABLET | Refills: 2 | Status: SHIPPED | OUTPATIENT
Start: 2022-05-16 | End: 2024-01-11 | Stop reason: SDUPTHER

## 2022-05-16 RX ORDER — EMPAGLIFLOZIN 25 MG/1
1 TABLET, FILM COATED ORAL DAILY
Qty: 90 TABLET | Refills: 2 | Status: SHIPPED | OUTPATIENT
Start: 2022-05-16 | End: 2022-11-18 | Stop reason: SDUPTHER

## 2022-05-16 RX ORDER — SOTALOL HYDROCHLORIDE 80 MG/1
80 TABLET ORAL
COMMUNITY
End: 2022-11-18

## 2022-05-16 RX ORDER — TESTOSTERONE CYPIONATE 200 MG/ML
INJECTION, SOLUTION INTRAMUSCULAR
COMMUNITY
End: 2022-11-18

## 2022-05-16 RX ORDER — DULAGLUTIDE 0.75 MG/.5ML
0.5 INJECTION, SOLUTION SUBCUTANEOUS
Qty: 6 ML | Refills: 3 | Status: SHIPPED | OUTPATIENT
Start: 2022-05-16 | End: 2022-11-18 | Stop reason: SDUPTHER

## 2022-05-16 RX ORDER — INSULIN DEGLUDEC 100 U/ML
30 INJECTION, SOLUTION SUBCUTANEOUS
Qty: 30 ML | Refills: 3 | Status: SHIPPED | OUTPATIENT
Start: 2022-05-16 | End: 2022-11-18 | Stop reason: SDUPTHER

## 2022-05-16 RX ORDER — BLOOD SUGAR DIAGNOSTIC
STRIP MISCELLANEOUS
COMMUNITY
Start: 2022-05-03

## 2022-05-16 RX ORDER — PRAVASTATIN SODIUM 40 MG
1 TABLET ORAL
COMMUNITY
Start: 2022-05-09 | End: 2022-11-18

## 2022-05-16 RX ORDER — INSULIN DEGLUDEC INJECTION 100 U/ML
INJECTION, SOLUTION SUBCUTANEOUS
COMMUNITY
End: 2022-11-18

## 2022-05-16 NOTE — PROGRESS NOTES
CHIEF COMPLAINT: Patient is here for follow up of Type 2 Diabetes Mellitus.   Previously seen by Dr. Poole with last appointment on 10/20/2021.   HPI:     Kaveh Mayfield is a 71 y.o. male with Type 2 Diabetes Mellitus here for follow up.    Type 2 Diabetes  Patient reports overall he's doing well since his last appointment. No new issues or concerns at this time.     Current Diabetes Regimen:   Tresiba 28 units daily  Jardiance 25 mg daily  Trulicity .75mg weekly    Unable to tolerate high doses of ozempic.    POC A1c 05/16/2022: 7.4%  Labs from October 20, 2021 show A1c is 7.9%  Labs from 3/1/40936 show a1c was 7.2% (6.7% in [facility] labs)       BG Diary:05/16/22 110-160 Fasting.     Weight is unchanged from last appointment.     Diabetes Complications   Retinopathy: No known retinopathy.  Last eye exam: Overdue 02/2021 [facility].   Neuropathy: Denies paresthesias or numbness in hands or feet. Denies any foot wounds.  Exercise: Minimal.  Diet: Fair.    BP currently 124/70.   Patient current taking lisinopril 5mg daily.   Microalbumin 01/19/2022: 21.4, Creatinine 2.01.   He is being followed by nephrologist Dr. Felder at [place].       Mixed hyperlipidemia   Currently taking pravastatin and fenofibrate  Patient denies myalgias and muscle cramps. He's tolerated dual therapy well for over 2 years.   Labs 12/17/2021: Total Chol 129, TRIGs 170, HDL 28, LDL 67    Hypogonadism  Patient currently taking Testosterone Cypionate 200mg IM Q 14 days for over 1 year.   Last Injection 05/08/2022.   No current last to review.   Prostate exam completed in 01/2022 with Urologist.   Labs 10/13/2021: 94.       Patient's medications, allergies, and social histories were reviewed and updated as appropriate.    ROS:     CONS:     No fever, no chills   EYES:     No diplopia, no blurry vision   CV:           No chest pain, no palpitations   PULM:     No SOB, no cough, no hemoptysis.   GI:            No nausea, no  "vomiting, no diarrhea, no constipation   ENDO:     No polyuria, no polydipsia, no heat intolerance, no cold intolerance       Past Medical History:  Problem List:  2020: CKD stage 3 due to type 2 diabetes mellitus ([facility])  2020: Type 2 diabetes mellitus with hyperglycemia, with long-term   current use of insulin ([facility])  2020: Dyslipidemia  2020: CKD stage 3 due to type 1 diabetes mellitus ([facility])  2020: Long-term insulin use ([facility])  2011: CAD (coronary artery disease)  2011: A-fib ([facility])  2011: HTN (hypertension)  2011: Elevated lipids      Past Surgical History:  Past Surgical History:   Procedure Laterality Date   • SHOULDER ARTHROSCOPY W/ ROTATOR CUFF REPAIR  2013    Performed by Mickey Liu M.D. at [facility]   • CLAVICLE DISTAL EXCISION  2013    Performed by Mickey Liu M.D. at [facility]   • SHOULDER DECOMPRESSION ARTHROSCOPIC  2013    Performed by Mickey Liu M.D. at [facility]   • ANGIOGRAM  2006    stent placement        Allergies:  Nkda [no known drug allergy]     Social History:  Social History     Tobacco Use   • Smoking status: Former Smoker     Packs/day: 3.00     Years: 32.00     Pack years: 96.00     Types: Cigarettes     Quit date: 2006     Years since quittin.1   • Smokeless tobacco: Never Used   Vaping Use   • Vaping Use: Never used   Substance Use Topics   • Alcohol use: Yes     Alcohol/week: 1.5 oz     Types: 3 drink(s) per week   • Drug use: No        Family History:   family history includes Heart Disease in an other family member; Hypertension in an other family member; Stroke in an other family member.      PHYSICAL EXAM:   OBJECTIVE:  Vital signs: /70   Pulse 68   Ht 1.905 m (6' 3\")   Wt 102 kg (225 lb)   SpO2 94%   BMI 28.12 kg/m²   GENERAL: Well-developed, well-nourished in no apparent distress.   EYE:  No ocular asymmetry, PERRLA  HENT: Pink, moist mucous membranes.  "   NECK: No thyromegaly.   CARDIOVASCULAR:  No murmurs  LUNGS: Clear breath sounds  ABDOMEN: Soft, nontender   EXTREMITIES: No clubbing, cyanosis, or edema.   NEUROLOGICAL: No gross focal motor abnormalities   LYMPH: No cervical adenopathy palpated.   SKIN: No rashes, lesions.     ASSESSMENT/PLAN:     1. Type 2 diabetes mellitus with hyperglycemia, with long-term current use of insulin (HCC)  Unstable, Much Improved.   A1c is down to 7.4%  Increase Tresiba to 30 units daily  Continue Jardiance to 25 mg daily  Continue trulicity .75mg weekly    Continue daily home glucose monitoring.     Recommend dilated eye exam.   Recommend 150 minutes of exercise weekly.   Recommend 2L to 3L of water daily.   Continue balanced meal planning such as [email or web address].       2. Dyslipidemia  Stable.   Continue pravastatin and fenofibrate      3. 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 Long-term insulin use (HCC)  Patient is on long-term basal insulin therapy with a GLP-1 for type 2 diabetes management    5. Hypogonadism  Continue taking Testosterone Cypionate 200mg Q 14 days.   Complete mid dose draw before next appointment.     Complete labs 1-2 weeks before next appointment.   Complete POC A1c at next appointment.   Next appointment in 4 months.       Thank you kindly for allowing me to participate in the diabetes care plan for this patient.    Marilyn Camarillo, JEFF  05/16/2022

## 2022-05-16 NOTE — PROGRESS NOTES
RN-CDE Note    Subjective:   Endocrinology Clinic Progress Note  PCP: MARCO ARIAS P.A.-C.    HPI:  Kaveh Mayfield is a 71 y.o. old patient who is seen today for review of Type 2 Diabetes and Hypogonadism.  Recent changes in health: He states the Trulicity gives him diarrhea for a few days but tolerating it.  DM:   Last A1c:   Lab Results   Component Value Date/Time    HBA1C 7.4 (A) 05/16/2022 01:22 PM      Previous A1c was 8.1 on 1/19/22  A1C GOAL: < 7    Diabetes Medications:   Tresiba 28 units daily  Jardiance 25 mg daily  Trulicity .75 mg weekly      Exercise: Walking and yard work  Diet: Leftovers for breakfast and lunch, Protein, vegetables, and carbohydrates.  Trying to not snack as much in the evening.   Patient's body mass index is 28.12 kg/m². Exercise and nutrition counseling were performed at this visit.    Glucose monitoring frequency: Testing fasting  110-160  Hypoglycemic episodes: no  Last Retinal Exam: 1 year ago in February at [facility] [place] and making another appointment.  Daily Foot Exam: Yes   Foot Exam:  Monofilament: done  Monofilament testing with a 10 gram force: sensation intact: intact bilaterally  Visual Inspection: Feet with maceration, ulcers, fissures.  Tops of second and third toes scraped from swimming pool.  Pedal pulses: intact bilaterally   Lab Results   Component Value Date/Time    MICROALBCALC 21.40 12/17/2021 12:00 AM     He  reports that he quit smoking about 16 years ago. His smoking use included cigarettes. He has a 96.00 pack-year smoking history. He has never used smokeless tobacco.      Plan:     Discussed and educated on:   - All medications, side effects and compliance (discussed carefully)  - Annual eye examinations at Ophthalmology  - Home glucose monitoring emphasized  - Weight control and daily exercise    Recommended medication changes: Increase Tresiba to 30 units daily.

## 2022-05-16 NOTE — PROGRESS NOTES
CHIEF COMPLAINT: Patient is here for follow up of Type 2 Diabetes Mellitus.     HPI:     Kaveh Mayfield is a 69 y.o. male with Type 2 Diabetes Mellitus here for follow up.    Labs from October 20, 2021 show A1c is 7.9%  Labs from 3/1/10420 show a1c was 7.2% (6.7% in [facility] labs)   Labs from 9/23/2020 show a1c was 8.0% ([facility] labs)  Labs from 8/19/2020 HbA1c is improved at 8.5%  His baseline A1c was 10% at [facility] back in May 2020  Most of his labs are done at an outside facility      I first saw him as a consult on July 2020 for uncontrolled diabetes.   He has a history of coronary artery disease, chronic kidney disease st III and obesity  After his initial consultation visit I started him on a GLP-1 and later on added an SGLT2 inhibitor.    Unfortunately most of his labs are done at [facility]  He also cannot afford a lot of his medications  Unfortunately he did not qualify for patient assistance programs      On follow-up he is taking Tresiba 22 units daily, Ozempic 0.25 mg once a week, Jardiance 10mg daily    He cant tolerate high doses of ozempic  He forgot his glucose meter despite repeated request to do so  We talked about increasing his Jardiance today and his basal insulin      He has mixed hyperlipidemia at baseline and is taking pravastatin and fenofibrate  He is tolerating both medications well  He denies muscle aches and cramps  LDL-C was 93 on 11/2020 ( see outside labs from [facility])  We do not have an updated lipid panel on hand        Regards his chronic kidney disease this is stable.  His serum creatinine stable at 1.7 from outside labs at [facility] October 2021  He is being followed by nephrologist Dr. Felder at [place]  I do not have his current clinic notes from his nephrologist on hand  He is on a low-dose ACE inhibitor, lisinopril 5 mg daily and amlodipine 10 mg daily  He does have microalbuminuria on his most recent labs from [facility]  on February 2021        He is also on sotalol because of paroxysmal A. fib  He has hypogonadism and is on testosterone injections but this is being managed by another physician      BG Diary:  Patient did not bring meter    Weight has been stable    Diabetes Complications   Retinopathy: No known retinopathy.  Last eye exam: We are requesting records of his eye exam  Neuropathy: Denies paresthesias or numbness in hands or feet. Denies any foot wounds.  Exercise: Minimal.  Diet: Fair.      Last Injection one week ago.       Patient's medications, allergies, and social histories were reviewed and updated as appropriate.    ROS:     CONS:     No fever, no chills   EYES:     No diplopia, no blurry vision   CV:           No chest pain, no palpitations   PULM:     No SOB, no cough, no hemoptysis.   GI:            No nausea, no vomiting, no diarrhea, no constipation   ENDO:     No polyuria, no polydipsia, no heat intolerance, no cold intolerance       Past Medical History:  Problem List:  2020-11: CKD stage 3 due to type 2 diabetes mellitus ([facility])  2020-06: Type 2 diabetes mellitus with hyperglycemia, with long-term   current use of insulin ([facility])  2020-06: Dyslipidemia  2020-06: CKD stage 3 due to type 1 diabetes mellitus ([facility])  2020-06: Long-term insulin use ([facility])  2011-06: CAD (coronary artery disease)  2011-06: A-fib ([facility])  2011-06: HTN (hypertension)  2011-06: Elevated lipids      Past Surgical History:  Past Surgical History:   Procedure Laterality Date   • SHOULDER ARTHROSCOPY W/ ROTATOR CUFF REPAIR  8/7/2013    Performed by Mickey Liu M.D. at [facility]   • CLAVICLE DISTAL EXCISION  8/7/2013    Performed by Mickey Liu M.D. at [facility]   • SHOULDER DECOMPRESSION ARTHROSCOPIC  8/7/2013    Performed by Mickey Liu M.D. at [facility]   • ANGIOGRAM  2006    stent placement        Allergies:  Nkda [no known drug allergy]     Social History:  Social  "History     Tobacco Use   • Smoking status: Former Smoker     Packs/day: 3.00     Years: 32.00     Pack years: 96.00     Types: Cigarettes     Quit date: 2006     Years since quittin.1   • Smokeless tobacco: Never Used   Vaping Use   • Vaping Use: Never used   Substance Use Topics   • Alcohol use: Yes     Alcohol/week: 1.5 oz     Types: 3 drink(s) per week   • Drug use: No        Family History:   family history includes Heart Disease in an other family member; Hypertension in an other family member; Stroke in an other family member.      PHYSICAL EXAM:   OBJECTIVE:  Vital signs: /70   Pulse 68   Ht 1.905 m (6' 3\")   Wt 102 kg (225 lb)   SpO2 94%   BMI 28.12 kg/m²   GENERAL: Well-developed, well-nourished in no apparent distress.   EYE:  No ocular asymmetry, PERRLA  HENT: Pink, moist mucous membranes.    NECK: No thyromegaly.   CARDIOVASCULAR:  No murmurs  LUNGS: Clear breath sounds  ABDOMEN: Soft, nontender   EXTREMITIES: No clubbing, cyanosis, or edema.   NEUROLOGICAL: No gross focal motor abnormalities   LYMPH: No cervical adenopathy palpated.   SKIN: No rashes, lesions.       Labs:  Lab Results   Component Value Date/Time    HBA1C 7.4 (A) 2022 01:22 PM        Lab Results   Component Value Date/Time    WBC 8.9 10/17/2016 09:46 PM    RBC 4.52 (L) 10/17/2016 09:46 PM    HEMOGLOBIN 15.1 10/17/2016 09:46 PM    .5 (H) 10/17/2016 09:46 PM    MCH 33.4 (H) 10/17/2016 09:46 PM    MCHC 32.9 (L) 10/17/2016 09:46 PM    RDW 70.4 (H) 10/17/2016 09:46 PM    MPV 11.1 10/17/2016 09:46 PM       Lab Results   Component Value Date/Time    SODIUM 131 (L) 10/17/2016 09:46 PM    POTASSIUM 4.8 10/17/2016 09:46 PM    CHLORIDE 97 10/17/2016 09:46 PM    CO2 23 10/17/2016 09:46 PM    ANION 11.0 10/17/2016 09:46 PM    GLUCOSE 200 (H) 10/17/2016 09:46 PM    BUN 19 10/17/2016 09:46 PM    CREATININE 2.01 2021 12:00 AM    CALCIUM 9.8 10/17/2016 09:46 PM    ASTSGOT 46 (H) 10/17/2016 09:46 PM    ALTSGPT 29 " 10/17/2016 09:46 PM    TBILIRUBIN 1.0 10/17/2016 09:46 PM    ALBUMIN 4.6 10/17/2016 09:46 PM    TOTPROTEIN 7.7 10/17/2016 09:46 PM    GLOBULIN 3.1 10/17/2016 09:46 PM    AGRATIO 1.5 10/17/2016 09:46 PM       No results found for: CHOLSTRLTOT, TRIGLYCERIDE, HDL, LDL, CHOLHDLRAT, NONHDL    Lab Results   Component Value Date/Time    MICROALBCALC 21.40 12/17/2021 12:00 AM        No results found for: TSHULTRASEN  No results found for: FREEDIR  No results found for: FREET3  No results found for: THYSTIMIG        ASSESSMENT/PLAN:     1. Type 2 diabetes mellitus with hyperglycemia, with long-term current use of insulin (HCC)  Improved control  A1c is down to 7%  Increase Tresiba to 28 units daily  Increase Jardiance to 25 mg daily  Continue Ozempic 0.25 mg weekly  I need him to get labs today   follow-up in 3 months      2. Dyslipidemia  Controlled  Continue pravastatin and fenofibrate  Repeat fasting lipids in 3 months    3. 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 Long-term insulin use (HCC)  Patient is on long-term basal insulin therapy with a GLP-1 for type 2 diabetes management      No follow-ups on file.      Thank you kindly for allowing me to participate in the diabetes care plan for this patient.    Espinoza Poole MD, [facility], [facility]  08/19/20    CC:   Tushar Stanley M.D.

## 2022-10-17 ENCOUNTER — TELEPHONE (OUTPATIENT)
Dept: ENDOCRINOLOGY | Facility: MEDICAL CENTER | Age: 72
End: 2022-10-17
Payer: MEDICARE

## 2022-10-17 NOTE — TELEPHONE ENCOUNTER
Contacted patient at [phone] to discuss [facility] and services/benefits offered. No answer, left voicemail.    Amelia Gramajo  Rx Coordinator   [phone]

## 2022-11-18 ENCOUNTER — OFFICE VISIT (OUTPATIENT)
Dept: ENDOCRINOLOGY | Facility: MEDICAL CENTER | Age: 72
End: 2022-11-18
Attending: INTERNAL MEDICINE
Payer: MEDICARE

## 2022-11-18 VITALS
BODY MASS INDEX: 27.1 KG/M2 | HEIGHT: 75 IN | SYSTOLIC BLOOD PRESSURE: 122 MMHG | OXYGEN SATURATION: 92 % | HEART RATE: 76 BPM | DIASTOLIC BLOOD PRESSURE: 70 MMHG | WEIGHT: 218 LBS

## 2022-11-18 DIAGNOSIS — E11.22 CKD STAGE 3 DUE TO TYPE 2 DIABETES MELLITUS (HCC): ICD-10-CM

## 2022-11-18 DIAGNOSIS — E55.9 VITAMIN D DEFICIENCY: ICD-10-CM

## 2022-11-18 DIAGNOSIS — Z79.4 TYPE 2 DIABETES MELLITUS WITH HYPERGLYCEMIA, WITH LONG-TERM CURRENT USE OF INSULIN (HCC): ICD-10-CM

## 2022-11-18 DIAGNOSIS — E78.5 DYSLIPIDEMIA: ICD-10-CM

## 2022-11-18 DIAGNOSIS — E11.65 TYPE 2 DIABETES MELLITUS WITH HYPERGLYCEMIA, WITH LONG-TERM CURRENT USE OF INSULIN (HCC): ICD-10-CM

## 2022-11-18 DIAGNOSIS — E29.1 HYPOGONADISM IN MALE: ICD-10-CM

## 2022-11-18 DIAGNOSIS — N18.30 CKD STAGE 3 DUE TO TYPE 2 DIABETES MELLITUS (HCC): ICD-10-CM

## 2022-11-18 DIAGNOSIS — Z79.4 LONG-TERM INSULIN USE (HCC): ICD-10-CM

## 2022-11-18 LAB
HBA1C MFR BLD: 7.2 % (ref 0–5.6)
INT CON NEG: ABNORMAL
INT CON POS: ABNORMAL

## 2022-11-18 PROCEDURE — 83036 HEMOGLOBIN GLYCOSYLATED A1C: CPT | Performed by: INTERNAL MEDICINE

## 2022-11-18 PROCEDURE — 99214 OFFICE O/P EST MOD 30 MIN: CPT | Performed by: INTERNAL MEDICINE

## 2022-11-18 PROCEDURE — 99212 OFFICE O/P EST SF 10 MIN: CPT | Performed by: INTERNAL MEDICINE

## 2022-11-18 RX ORDER — DULAGLUTIDE 0.75 MG/.5ML
0.5 INJECTION, SOLUTION SUBCUTANEOUS
Qty: 6 ML | Refills: 3 | Status: SHIPPED | OUTPATIENT
Start: 2022-11-18 | End: 2022-11-21

## 2022-11-18 RX ORDER — INSULIN DEGLUDEC 100 U/ML
30 INJECTION, SOLUTION SUBCUTANEOUS
Qty: 30 ML | Refills: 3 | Status: SHIPPED | OUTPATIENT
Start: 2022-11-18 | End: 2023-05-18 | Stop reason: SDUPTHER

## 2022-11-18 RX ORDER — EMPAGLIFLOZIN 25 MG/1
1 TABLET, FILM COATED ORAL DAILY
Qty: 90 TABLET | Refills: 2 | Status: SHIPPED | OUTPATIENT
Start: 2022-11-18 | End: 2023-08-22

## 2022-11-18 RX ORDER — TESTOSTERONE CYPIONATE 200 MG/ML
100 INJECTION, SOLUTION INTRAMUSCULAR
Qty: 2 ML | Refills: 5 | Status: SHIPPED | OUTPATIENT
Start: 2022-11-18 | End: 2022-12-16

## 2022-11-18 ASSESSMENT — PATIENT HEALTH QUESTIONNAIRE - PHQ9: CLINICAL INTERPRETATION OF PHQ2 SCORE: 0

## 2022-11-18 NOTE — PROGRESS NOTES
"RN-CDE Note    Subjective:   Endocrinology Clinic Progress Note  PCP: MARCO ARIAS P.A.-C.    HPI:  Kaveh Mayfield is a 71 y.o. old patient who is seen today by the Diabetes Nurse Specialist for review of Type 2 Diabetes and Hypogonadism.  Recent changes in health: Health good.  DM:   Last A1c:   Lab Results   Component Value Date/Time    HBA1C 7.2 (A) 11/18/2022 03:20 PM      Previous A1c was 7.4 on 5/16/22  A1C GOAL: < 7    Diabetes Medications:   Tresiba 30 units daily  Jardiance 25 mg daily  Trulicity .75 mg weekly      Exercise: Walking  Diet: \"healthy\" diet  in general  Patient's body mass index is 27.25 kg/m². Exercise and nutrition counseling were performed at this visit.    Glucose monitoring frequency: Testing daily  100-110  Hypoglycemic episodes: no  Last Retinal Exam:  6/13/22 with Dr. Schaffer  Daily Foot Exam: Yes   Foot Exam:  Monofilament: done  Monofilament testing with a 10 gram force: sensation intact: intact bilaterally  Visual Inspection: Feet without maceration, ulcers, fissures.  Pedal pulses: intact bilaterally   Lab Results   Component Value Date/Time    MICROALBCALC 21.40 12/17/2021 12:00 AM        ACR Albumin/Creatinine Ratio goal <30     HTN:   Blood pressure goal <140/<80 .   Currently Rx ACE/ARB: Yes     Dyslipidemia:    Lab Results   Component Value Date/Time    CHOLSTRLTOT 129 12/17/2021 12:00 AM    LDL 67 12/17/2021 12:00 AM    HDL 28 12/17/2021 12:00 AM    TRIGLYCERIDE 170 12/17/2021 12:00 AM         Currently Rx Statin: Yes     He  reports that he quit smoking about 16 years ago. His smoking use included cigarettes. He has a 96.00 pack-year smoking history. He has never used smokeless tobacco.      Plan:     Discussed and educated on:   - All medications, side effects and compliance (discussed carefully)  - Annual eye examinations at Ophthalmology  - Home glucose monitoring emphasized  - Weight control and daily exercise    Recommended medication changes: No changes at this " time.

## 2022-11-19 DIAGNOSIS — Z79.4 TYPE 2 DIABETES MELLITUS WITH HYPERGLYCEMIA, WITH LONG-TERM CURRENT USE OF INSULIN (HCC): ICD-10-CM

## 2022-11-19 DIAGNOSIS — E11.65 TYPE 2 DIABETES MELLITUS WITH HYPERGLYCEMIA, WITH LONG-TERM CURRENT USE OF INSULIN (HCC): ICD-10-CM

## 2022-11-19 NOTE — PROGRESS NOTES
CHIEF COMPLAINT: Patient is here for follow up of Type 2 Diabetes Mellitus    HPI:     Kaveh Mayfield is a 71 y.o. male with Type 2 Diabetes Mellitus here for follow up.    Labs from 11/18/2022 HbA1c is 7.2%    Comorbid issues include chronic kidney disease stage III and hypogonadism  He usually gets his labs done at [facility]  He lives in [place]  He cannot tolerate higher doses of Trulicity because of nausea and vomiting        Currently he is on  Tresiba 30 units daily  Jardiance 25 mg daily  Trulicity 0.75 mg weekly        He denies any side effects with his medications he reports his sugars are well controlled        He has hyperlipidemia and is on pravastatin 40 mg daily  He has coronary artery disease  He is being followed by Dr. Reeves  His LDL cholesterol was 61 on June 9, 2022 at [place]        He does not have diabetic kidney disease  He is on an ACE inhibitor lisinopril 5 mg daily  His blood pressure is well controlled  His U ACR was less than 30 on June 9, 2022 at [place]  He does have chronic kidney disease stage III  His GFR is stable  Serum creatinine was 2.0 on June 9, 2022      Point-of-care eye exam was completed in the office today        With respect to his hypogonadism he is on testosterone 200 mg every 2 weeks  His last hematocrit was elevated 61% on July 27, 2022  We talked about cutting back or reducing his testosterone dose  He denies chest pain  He denies lower urinary tract obstructive symptoms        His TSH was normal 1.0 on July 27, 2022          BG Diary:11/18/22  Patient forgot her list meter    Weight has decreased 5  pounds over last 3 to 6 months    Diabetes Complications   Retinopathy: No known retinopathy.  Last eye exam: Point-of-care eye exam was completed today  Neuropathy: Denies paresthesias or numbness in hands or feet. Denies any foot wounds.  Exercise: Minimal.  Diet: Fair.  Patient's medications, allergies, and social histories were reviewed  and updated as appropriate.    ROS:     CONS:     No fever, no chills   EYES:     No diplopia, no blurry vision   CV:           No chest pain, no palpitations   PULM:     No SOB, no cough, no hemoptysis.   GI:            No nausea, no vomiting, no diarrhea, no constipation   ENDO:     No polyuria, no polydipsia, no heat intolerance, no cold intolerance       Past Medical History:  Problem List:  2022: Hypogonadism in male  2021-10: Polyneuropathy  2021-10: Renal insufficiency syndrome  2021-10: Atrial fibrillation ([facility])  2021-10: Coronary artery disease  2021-10: Hyperlipidemia  2021-10: Essential (primary) hypertension  2021-10: Diabetes mellitus, type 2 ([facility])  2020: CKD stage 3 due to type 2 diabetes mellitus ([facility])  2020: Type 2 diabetes mellitus with hyperglycemia, with long-term   current use of insulin ([facility])  2020: Dyslipidemia  2020: CKD stage 3 due to type 1 diabetes mellitus ([facility])  2020: Long-term insulin use ([facility])  2011: CAD (coronary artery disease)  2011: A-fib ([facility])  2011: HTN (hypertension)  2011: Elevated lipids      Past Surgical History:  Past Surgical History:   Procedure Laterality Date    SHOULDER ARTHROSCOPY W/ ROTATOR CUFF REPAIR  2013    Performed by Mickey Liu M.D. at [facility]    CLAVICLE DISTAL EXCISION  2013    Performed by Mickey Liu M.D. at [facility]    SHOULDER DECOMPRESSION ARTHROSCOPIC  2013    Performed by Mickey Liu M.D. at [facility]    ANGIOGRAM  2006    stent placement        Allergies:  Nkda [no known drug allergy]     Social History:  Social History     Tobacco Use    Smoking status: Former     Packs/day: 3.00     Years: 32.00     Pack years: 96.00     Types: Cigarettes     Quit date: 2006     Years since quittin.6    Smokeless tobacco: Never   Vaping Use    Vaping Use: Never used   Substance Use Topics    Alcohol use: Yes     Alcohol/week: 1.5 oz      "Types: 3 drink(s) per week    Drug use: No        Family History:   family history includes Heart Disease in an other family member; Hypertension in an other family member; Stroke in an other family member.      PHYSICAL EXAM:   OBJECTIVE:  Vital signs: /70   Pulse 76   Ht 1.905 m (6' 3\")   Wt 98.9 kg (218 lb)   SpO2 92%   BMI 27.25 kg/m²   GENERAL: Well-developed, well-nourished in no apparent distress.   EYE:  No ocular asymmetry, PERRLA  HENT: Pink, moist mucous membranes.    NECK: No thyromegaly.   CARDIOVASCULAR:  No murmurs  LUNGS: Clear breath sounds  ABDOMEN: Soft, nontender   EXTREMITIES: No clubbing, cyanosis, or edema.   NEUROLOGICAL: No gross focal motor abnormalities   LYMPH: No cervical adenopathy palpated.   SKIN: No rashes, lesions.     Labs:  Lab Results   Component Value Date/Time    HBA1C 7.2 (A) 11/18/2022 03:20 PM        Lab Results   Component Value Date/Time    WBC 8.9 10/17/2016 09:46 PM    RBC 4.52 (L) 10/17/2016 09:46 PM    HEMOGLOBIN 13.9 08/14/2018 11:26 AM    HEMOGLOBIN 15.1 10/17/2016 09:46 PM    .5 (H) 10/17/2016 09:46 PM    MCH 33.4 (H) 10/17/2016 09:46 PM    MCHC 32.9 (L) 10/17/2016 09:46 PM    RDW 70.4 (H) 10/17/2016 09:46 PM    MPV 11.1 10/17/2016 09:46 PM       Lab Results   Component Value Date/Time    SODIUM 131 (L) 10/17/2016 09:46 PM    POTASSIUM 4.8 10/17/2016 09:46 PM    CHLORIDE 97 10/17/2016 09:46 PM    CO2 23 10/17/2016 09:46 PM    ANION 11.0 10/17/2016 09:46 PM    GLUCOSE 200 (H) 10/17/2016 09:46 PM    BUN 19 10/17/2016 09:46 PM    CREATININE 2.01 12/17/2021 12:00 AM    CALCIUM 9.8 10/17/2016 09:46 PM    ASTSGOT 46 (H) 10/17/2016 09:46 PM    ALTSGPT 29 10/17/2016 09:46 PM    TBILIRUBIN 1.0 10/17/2016 09:46 PM    ALBUMIN 4.6 10/17/2016 09:46 PM    TOTPROTEIN 7.7 10/17/2016 09:46 PM    GLOBULIN 3.1 10/17/2016 09:46 PM    AGRATIO 1.5 10/17/2016 09:46 PM       Lab Results   Component Value Date/Time    CHOLSTRLTOT 129 12/17/2021 0000    TRIGLYCERIDE 170 " 12/17/2021 0000    HDL 28 12/17/2021 0000    LDL 67 12/17/2021 0000       Lab Results   Component Value Date/Time    MICROALBCALC 21.40 12/17/2021 12:00 AM        No results found for: TSHULTRASEN  No results found for: FREEDIR  No results found for: FREET3  No results found for: THYSTIMIG        ASSESSMENT/PLAN:     1. Type 2 diabetes mellitus with hyperglycemia, with long-term current use of insulin (HCC)  Controlled  A1c is fair at 7.2%  Continue Tresiba 30 units daily  Continue Jardiance 25 mg daily  Continue Trulicity 0.75 mg weekly  He is up-to-date with his labs  Point-of-care eye exam was completed today  Follow-up in 6 months      2. Hypogonadism in male  Uncontrolled  Reduce testosterone to 100 mg every 2 weeks  Repeat testosterone and hematocrit levels in 6 months    3. 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 Dyslipidemia  Stable  Continue pravastatin  Continue monitoring    5. Long-term insulin use (HCC)  Patient is on long-term basal insulin with other oral agents for type 2 diabetes management    6. Vitamin D deficiency  Stable  We will check calcium vitamin D with future labs      Return in about 6 months (around 5/18/2023).      This patient during there office visit today was started on a new medication.  Side effects of the new medication were discussed with the patient today in the office.     Thank you kindly for allowing me to participate in the diabetes care plan for this patient.    Espinoza Poole MD, JIM, [facility]  11/18/22    CC:   MARCO ARIAS P.A.-C.

## 2022-11-21 RX ORDER — DULAGLUTIDE 0.75 MG/.5ML
INJECTION, SOLUTION SUBCUTANEOUS
Qty: 2 ML | Refills: 6 | Status: SHIPPED | OUTPATIENT
Start: 2022-11-21 | End: 2023-05-18

## 2023-02-24 DIAGNOSIS — E78.1 HYPERTRIGLYCERIDEMIA: ICD-10-CM

## 2023-02-24 RX ORDER — FENOFIBRATE 160 MG/1
160 TABLET ORAL DAILY
Qty: 90 TABLET | Refills: 2 | Status: SHIPPED | OUTPATIENT
Start: 2023-02-24 | End: 2023-02-28 | Stop reason: SDUPTHER

## 2023-02-28 DIAGNOSIS — E78.1 HYPERTRIGLYCERIDEMIA: ICD-10-CM

## 2023-02-28 RX ORDER — FENOFIBRATE 160 MG/1
160 TABLET ORAL DAILY
Qty: 90 TABLET | Refills: 2 | Status: SHIPPED | OUTPATIENT
Start: 2023-02-28 | End: 2023-05-18

## 2023-05-15 LAB
CHOLEST SERPL-MCNC: 157 MG/DL
CREAT SERPL-MCNC: 2.27 MG/DL
HDLC SERPL-MCNC: 29 MG/DL (ref 40–60)
LDLC SERPL CALC-MCNC: 61 MG/DL (ref ?–100)
MICROAL CRT RATIO 4634: 48.9 (ref 1.3–30)
TRIGL SERPL-MCNC: 266 MG/DL (ref 150–200)

## 2023-05-18 ENCOUNTER — OFFICE VISIT (OUTPATIENT)
Dept: ENDOCRINOLOGY | Facility: MEDICAL CENTER | Age: 73
End: 2023-05-18
Attending: INTERNAL MEDICINE
Payer: MEDICARE

## 2023-05-18 VITALS
SYSTOLIC BLOOD PRESSURE: 104 MMHG | OXYGEN SATURATION: 91 % | BODY MASS INDEX: 27.06 KG/M2 | HEIGHT: 75 IN | HEART RATE: 76 BPM | DIASTOLIC BLOOD PRESSURE: 60 MMHG | WEIGHT: 217.6 LBS

## 2023-05-18 DIAGNOSIS — E55.9 VITAMIN D DEFICIENCY: ICD-10-CM

## 2023-05-18 DIAGNOSIS — E78.5 DYSLIPIDEMIA: ICD-10-CM

## 2023-05-18 DIAGNOSIS — Z79.4 TYPE 2 DIABETES MELLITUS WITH HYPERGLYCEMIA, WITH LONG-TERM CURRENT USE OF INSULIN (HCC): ICD-10-CM

## 2023-05-18 DIAGNOSIS — E11.22 CKD STAGE 3 DUE TO TYPE 2 DIABETES MELLITUS (HCC): ICD-10-CM

## 2023-05-18 DIAGNOSIS — Z79.4 LONG-TERM INSULIN USE (HCC): ICD-10-CM

## 2023-05-18 DIAGNOSIS — N18.30 CKD STAGE 3 DUE TO TYPE 2 DIABETES MELLITUS (HCC): ICD-10-CM

## 2023-05-18 DIAGNOSIS — E11.65 TYPE 2 DIABETES MELLITUS WITH HYPERGLYCEMIA, WITH LONG-TERM CURRENT USE OF INSULIN (HCC): ICD-10-CM

## 2023-05-18 DIAGNOSIS — E29.1 HYPOGONADISM IN MALE: ICD-10-CM

## 2023-05-18 LAB
HBA1C MFR BLD: 8.1 % (ref ?–5.8)
POCT INT CON NEG: NEGATIVE
POCT INT CON POS: POSITIVE
RETINAL SCREEN: NEGATIVE

## 2023-05-18 PROCEDURE — 83036 HEMOGLOBIN GLYCOSYLATED A1C: CPT | Performed by: INTERNAL MEDICINE

## 2023-05-18 PROCEDURE — 3078F DIAST BP <80 MM HG: CPT | Performed by: INTERNAL MEDICINE

## 2023-05-18 PROCEDURE — 99213 OFFICE O/P EST LOW 20 MIN: CPT | Performed by: INTERNAL MEDICINE

## 2023-05-18 PROCEDURE — 92250 FUNDUS PHOTOGRAPHY W/I&R: CPT | Performed by: INTERNAL MEDICINE

## 2023-05-18 PROCEDURE — 99214 OFFICE O/P EST MOD 30 MIN: CPT | Performed by: INTERNAL MEDICINE

## 2023-05-18 PROCEDURE — 3074F SYST BP LT 130 MM HG: CPT | Performed by: INTERNAL MEDICINE

## 2023-05-18 RX ORDER — TESTOSTERONE CYPIONATE 200 MG/ML
200 INJECTION, SOLUTION INTRAMUSCULAR
Qty: 2 ML | Refills: 5 | Status: SHIPPED | OUTPATIENT
Start: 2023-05-18 | End: 2023-12-01 | Stop reason: SDUPTHER

## 2023-05-18 RX ORDER — INSULIN DEGLUDEC 100 U/ML
30 INJECTION, SOLUTION SUBCUTANEOUS
Qty: 30 ML | Refills: 6 | Status: SHIPPED | OUTPATIENT
Start: 2023-05-18 | End: 2024-01-11 | Stop reason: SDUPTHER

## 2023-05-18 RX ORDER — DULAGLUTIDE 1.5 MG/.5ML
0.5 INJECTION, SOLUTION SUBCUTANEOUS
Qty: 6 ML | Refills: 2 | Status: SHIPPED | OUTPATIENT
Start: 2023-05-18 | End: 2023-11-16

## 2023-05-18 ASSESSMENT — PATIENT HEALTH QUESTIONNAIRE - PHQ9: CLINICAL INTERPRETATION OF PHQ2 SCORE: 0

## 2023-05-18 NOTE — PROGRESS NOTES
RN-CDE Note    Subjective:   Endocrinology Clinic Progress Note  PCP: MARCO ARIAS P.A.-C.    HPI:  Kaveh Mayfield is a 72 y.o. old patient who is seen today by the Diabetes Nurse Specialist for review of his type 2 diabetes with long term use of insulin.    Recent changes in health: no changes  DM:   Last A1c:   Lab Results   Component Value Date/Time    HBA1C 8.1 (A) 05/18/2023 01:22 PM      Previous A1c was 7.2 on 11/18/22  A1C GOAL: < 7    Diabetes Medications:   Tresiba 0.75 units per day  (states it makes him sick with vomiting and diarrhea )  Trulicity 0.75 mg weekly  Jardiance 25 mg daily  Glucose monitoring frequency: testing most days running between .     Hypoglycemic episodes: no    Lab Results   Component Value Date/Time    MICROALBCALC 48.9 (A) 05/15/2023 12:00 AM        ACR Albumin/Creatinine Ratio goal <30     HTN:   Blood pressure goal <130/<80 .   Currently Rx ACE/ARB: Yes     Dyslipidemia:    Lab Results   Component Value Date/Time    CHOLSTRLTOT 157 05/15/2023 12:00 AM    LDL 61 05/15/2023 12:00 AM    HDL 29 (A) 05/15/2023 12:00 AM    TRIGLYCERIDE 266 (A) 05/15/2023 12:00 AM         Currently Rx Statin: Yes     He  reports that he quit smoking about 17 years ago. His smoking use included cigarettes. He has a 96.00 pack-year smoking history. He has never used smokeless tobacco.      Plan:     Discussed and educated on:   - All medications, side effects and compliance (discussed carefully)  - Annual eye examinations at [facility]  - Foot Care: what to look for when checking feet every day  - HbA1C: target  - Home glucose monitoring emphasized  - Weight control and daily exercise  POC retinal exam completed in office today

## 2023-05-18 NOTE — PROGRESS NOTES
CHIEF COMPLAINT: Patient is here for follow up of Type 2 Diabetes Mellitus    HPI:     Kaveh Mayfield is a 72 y.o. male with Type 2 Diabetes Mellitus here for follow up.      Labs from 5/18/2023 show a1c is 8.1%  Labs from 11/18/2022 HbA1c is 7.2%    Comorbid issues include chronic kidney disease stage III and hypogonadism  He usually gets his labs done at [facility]  He lives in [place]  He cannot tolerate higher doses of Trulicity because of nausea and vomiting        Currently he is on  Tresiba 30 units daily  Jardiance 25 mg daily  Trulicity 0.75 mg weekly        He denies any side effects with his medications   He admits that diet has been bad for the past 1.5 mos which affected his sugars.        He has hyperlipidemia and is on pravastatin 40 mg daily  He has coronary artery disease  He is being followed by Dr. Reeves  His LDL cholesterol was 72 on 5/2023 at [place]        He did not have diabetic kidney disease but now has new onset albuminuria   He is on an ACE inhibitor lisinopril 5 mg daily  His blood pressure is well controlled  UACR was 48 on 5/2023   UACR was less than 30 on June 9, 2022 at [place]  He does have chronic kidney disease stage III      Eye exam was last done on 6/13/2022  We are getting an exam today         With respect to his hypogonadism he is on testosterone 200 mg every 2 weeks  He denies chest pain  He denies lower urinary tract obstructive symptoms  His testosterone lab from [facility] is pending       His TSH was normal 1.0 on July 27, 2022          BG Diary:  Patient forgot her list meter    Weight has been stable    Diabetes Complications   Retinopathy: No known retinopathy.  Last eye exam: Point-of-care eye exam was completed on Nov 2022  Neuropathy: Denies paresthesias or numbness in hands or feet. Denies any foot wounds.  Exercise: Minimal.  Diet: Fair.  Patient's medications, allergies, and social histories were reviewed and updated as  appropriate.    ROS:     CONS:     No fever, no chills   EYES:     No diplopia, no blurry vision   CV:           No chest pain, no palpitations   PULM:     No SOB, no cough, no hemoptysis.   GI:            No nausea, no vomiting, no diarrhea, no constipation   ENDO:     No polyuria, no polydipsia, no heat intolerance, no cold intolerance       Past Medical History:  Problem List:  2022: Hypogonadism in male  2021-10: Polyneuropathy  2021-10: Renal insufficiency syndrome  2021-10: Atrial fibrillation ([facility])  2021-10: Coronary artery disease  2021-10: Hyperlipidemia  2021-10: Essential (primary) hypertension  2021-10: Diabetes mellitus, type 2 ([facility])  2020: CKD stage 3 due to type 2 diabetes mellitus ([facility])  2020: Type 2 diabetes mellitus with hyperglycemia, with long-term   current use of insulin ([facility])  2020: Dyslipidemia  2020: CKD stage 3 due to type 1 diabetes mellitus ([facility])  2020: Long-term insulin use ([facility])  2011: CAD (coronary artery disease)  2011: A-fib ([facility])  2011: HTN (hypertension)  2011: Elevated lipids      Past Surgical History:  Past Surgical History:   Procedure Laterality Date    SHOULDER ARTHROSCOPY W/ ROTATOR CUFF REPAIR  2013    Performed by Mickey Liu M.D. at [facility]    CLAVICLE DISTAL EXCISION  2013    Performed by Mickey Liu M.D. at [facility]    SHOULDER DECOMPRESSION ARTHROSCOPIC  2013    Performed by Mickey Liu M.D. at [facility]    ANGIOGRAM  2006    stent placement        Allergies:  Nkda [no known drug allergy]     Social History:  Social History     Tobacco Use    Smoking status: Former     Packs/day: 3.00     Years: 32.00     Pack years: 96.00     Types: Cigarettes     Quit date: 2006     Years since quittin.1    Smokeless tobacco: Never   Vaping Use    Vaping Use: Never used   Substance Use Topics    Alcohol use: Yes     Alcohol/week: 1.5 oz     Types: 3 drink(s)  "per week    Drug use: No        Family History:   family history includes Heart Disease in an other family member; Hypertension in an other family member; Stroke in an other family member.      PHYSICAL EXAM:   OBJECTIVE:  Vital signs: /60 (BP Location: Left arm, Patient Position: Sitting)   Pulse 76   Ht 1.905 m (6' 3\")   Wt 98.7 kg (217 lb 9.6 oz)   SpO2 91%   BMI 27.20 kg/m²   GENERAL: Well-developed, well-nourished in no apparent distress.   EYE:  No ocular asymmetry, PERRLA  HENT: Pink, moist mucous membranes.    NECK: No thyromegaly.   CARDIOVASCULAR:  No murmurs  LUNGS: Clear breath sounds  ABDOMEN: Soft, nontender   EXTREMITIES: No clubbing, cyanosis, or edema.   NEUROLOGICAL: No gross focal motor abnormalities   LYMPH: No cervical adenopathy palpated.   SKIN: No rashes, lesions.     Labs:  Lab Results   Component Value Date/Time    HBA1C 8.1 (A) 05/18/2023 01:22 PM        Lab Results   Component Value Date/Time    WBC 8.9 10/17/2016 09:46 PM    RBC 4.52 (L) 10/17/2016 09:46 PM    HEMOGLOBIN 13.9 08/14/2018 11:26 AM    HEMOGLOBIN 15.1 10/17/2016 09:46 PM    .5 (H) 10/17/2016 09:46 PM    MCH 33.4 (H) 10/17/2016 09:46 PM    MCHC 32.9 (L) 10/17/2016 09:46 PM    RDW 70.4 (H) 10/17/2016 09:46 PM    MPV 11.1 10/17/2016 09:46 PM       Lab Results   Component Value Date/Time    SODIUM 135 (L) 08/16/2018 05:33 AM    SODIUM 131 (L) 10/17/2016 09:46 PM    POTASSIUM 4.3 08/16/2018 05:33 AM    POTASSIUM 4.8 10/17/2016 09:46 PM    CHLORIDE 103 08/16/2018 05:33 AM    CHLORIDE 97 10/17/2016 09:46 PM    CO2 23 08/16/2018 05:33 AM    CO2 23 10/17/2016 09:46 PM    ANION 9 08/16/2018 05:33 AM    ANION 11.0 10/17/2016 09:46 PM    GLUCOSE 171 (H) 08/16/2018 05:33 AM    GLUCOSE 200 (H) 10/17/2016 09:46 PM    BUN 26 (H) 08/16/2018 05:33 AM    BUN 19 10/17/2016 09:46 PM    CREATININE 2.27 05/15/2023 12:00 AM    CALCIUM 9.5 08/16/2018 05:33 AM    CALCIUM 9.8 10/17/2016 09:46 PM    ASTSGOT 46 (H) 10/17/2016 09:46 PM "    ALTSGPT 29 10/17/2016 09:46 PM    TBILIRUBIN 1.0 10/17/2016 09:46 PM    ALBUMIN 4.6 10/17/2016 09:46 PM    TOTPROTEIN 7.7 10/17/2016 09:46 PM    GLOBULIN 3.1 10/17/2016 09:46 PM    AGRATIO 1.5 10/17/2016 09:46 PM       Lab Results   Component Value Date/Time    CHOLSTRLTOT 129 12/17/2021 0000    TRIGLYCERIDE 170 12/17/2021 0000    HDL 28 12/17/2021 0000    LDL 67 12/17/2021 0000       Lab Results   Component Value Date/Time    MICROALBCALC 48.9 (A) 05/15/2023 12:00 AM        No results found for: TSHULTRASEN  No results found for: FREEDIR  No results found for: FREET3  No results found for: THYSTIMIG        ASSESSMENT/PLAN:     1. Type 2 diabetes mellitus with hyperglycemia, with long-term current use of insulin (HCC)  Uncontrolled  A1c is higher at 8.1%  Increase Trulicity to 1.5 mg weekly  Continue Tresiba 30 units daily  Continue Jardiance 25 mg daily  He is up-to-date with his labs  Point-of-care eye exam was completed today  Follow-up in 6 months      2. Hypogonadism in male  Uncontrolled  I am awaiting his testosterone labs  He reports that he is still injecting 200 mg every 2 weeks instead of 100 mg every week  Repeat testosterone and hematocrit levels in 6 months    3. 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 Dyslipidemia  Stable  Continue pravastatin  Repeat fasting lipids in 1 year    5. Long-term insulin use (HCC)  Patient is on long-term basal insulin with other oral agents for type 2 diabetes management    6. Vitamin D deficiency  Stable  We will check calcium vitamin D with future labs      Return in about 6 months (around 11/18/2023).      Thank you kindly for allowing me to participate in the diabetes care plan for this patient.    Espinoza Poole MD, [facility], [facility]      CC:   MARCO ARIAS P.A.-C.

## 2023-08-22 DIAGNOSIS — E11.65 TYPE 2 DIABETES MELLITUS WITH HYPERGLYCEMIA, WITH LONG-TERM CURRENT USE OF INSULIN (HCC): ICD-10-CM

## 2023-08-22 DIAGNOSIS — Z79.4 TYPE 2 DIABETES MELLITUS WITH HYPERGLYCEMIA, WITH LONG-TERM CURRENT USE OF INSULIN (HCC): ICD-10-CM

## 2023-08-22 RX ORDER — EMPAGLIFLOZIN 25 MG/1
1 TABLET, FILM COATED ORAL DAILY
Qty: 90 TABLET | Refills: 2 | Status: SHIPPED | OUTPATIENT
Start: 2023-08-22 | End: 2024-02-27

## 2023-11-16 DIAGNOSIS — E11.65 TYPE 2 DIABETES MELLITUS WITH HYPERGLYCEMIA, WITH LONG-TERM CURRENT USE OF INSULIN (HCC): ICD-10-CM

## 2023-11-16 DIAGNOSIS — Z79.4 TYPE 2 DIABETES MELLITUS WITH HYPERGLYCEMIA, WITH LONG-TERM CURRENT USE OF INSULIN (HCC): ICD-10-CM

## 2023-11-16 RX ORDER — DULAGLUTIDE 1.5 MG/.5ML
INJECTION, SOLUTION SUBCUTANEOUS
Qty: 6 ML | Refills: 2 | Status: SHIPPED | OUTPATIENT
Start: 2023-11-16 | End: 2024-03-25

## 2023-12-01 DIAGNOSIS — E29.1 HYPOGONADISM IN MALE: ICD-10-CM

## 2023-12-01 NOTE — TELEPHONE ENCOUNTER
Received request via: Patients wife    Was the patient seen in the last year in this department? Yes    Does the patient have an active prescription (recently filled or refills available) for medication(s) requested? No    Does the patient have USP Plus and need 100 day supply (blood pressure, diabetes and cholesterol meds only)? Patient does not have SCP

## 2023-12-05 RX ORDER — TESTOSTERONE CYPIONATE 200 MG/ML
200 INJECTION, SOLUTION INTRAMUSCULAR
Qty: 2 ML | Refills: 5 | Status: SHIPPED | OUTPATIENT
Start: 2023-12-05 | End: 2024-05-21

## 2023-12-28 ENCOUNTER — TELEPHONE (OUTPATIENT)
Dept: MEDICAL GROUP | Facility: MEDICAL CENTER | Age: 73
End: 2023-12-28

## 2024-01-10 ENCOUNTER — TELEPHONE (OUTPATIENT)
Dept: ENDOCRINOLOGY | Facility: MEDICAL CENTER | Age: 74
End: 2024-01-10
Payer: MEDICARE

## 2024-01-11 ENCOUNTER — OFFICE VISIT (OUTPATIENT)
Dept: ENDOCRINOLOGY | Facility: MEDICAL CENTER | Age: 74
End: 2024-01-11
Attending: INTERNAL MEDICINE
Payer: MEDICARE

## 2024-01-11 VITALS
OXYGEN SATURATION: 92 % | BODY MASS INDEX: 26.73 KG/M2 | SYSTOLIC BLOOD PRESSURE: 116 MMHG | HEIGHT: 75 IN | HEART RATE: 85 BPM | RESPIRATION RATE: 16 BRPM | WEIGHT: 215 LBS | DIASTOLIC BLOOD PRESSURE: 58 MMHG

## 2024-01-11 DIAGNOSIS — E29.1 HYPOGONADISM IN MALE: ICD-10-CM

## 2024-01-11 DIAGNOSIS — E55.9 VITAMIN D DEFICIENCY: ICD-10-CM

## 2024-01-11 DIAGNOSIS — E78.5 DYSLIPIDEMIA: ICD-10-CM

## 2024-01-11 DIAGNOSIS — E11.22 CKD STAGE 3 DUE TO TYPE 2 DIABETES MELLITUS (HCC): ICD-10-CM

## 2024-01-11 DIAGNOSIS — Z79.4 LONG-TERM INSULIN USE (HCC): ICD-10-CM

## 2024-01-11 DIAGNOSIS — N18.30 CKD STAGE 3 DUE TO TYPE 2 DIABETES MELLITUS (HCC): ICD-10-CM

## 2024-01-11 DIAGNOSIS — Z79.4 TYPE 2 DIABETES MELLITUS WITH HYPERGLYCEMIA, WITH LONG-TERM CURRENT USE OF INSULIN (HCC): ICD-10-CM

## 2024-01-11 DIAGNOSIS — E11.65 TYPE 2 DIABETES MELLITUS WITH HYPERGLYCEMIA, WITH LONG-TERM CURRENT USE OF INSULIN (HCC): ICD-10-CM

## 2024-01-11 LAB
HBA1C MFR BLD: 7.1 % (ref ?–5.8)
POCT INT CON NEG: NEGATIVE
POCT INT CON POS: POSITIVE

## 2024-01-11 PROCEDURE — 83036 HEMOGLOBIN GLYCOSYLATED A1C: CPT | Performed by: INTERNAL MEDICINE

## 2024-01-11 PROCEDURE — 3074F SYST BP LT 130 MM HG: CPT | Performed by: INTERNAL MEDICINE

## 2024-01-11 PROCEDURE — 99214 OFFICE O/P EST MOD 30 MIN: CPT | Performed by: INTERNAL MEDICINE

## 2024-01-11 PROCEDURE — 3078F DIAST BP <80 MM HG: CPT | Performed by: INTERNAL MEDICINE

## 2024-01-11 RX ORDER — LISINOPRIL 5 MG/1
5 TABLET ORAL DAILY
Qty: 90 TABLET | Refills: 2 | Status: SHIPPED | OUTPATIENT
Start: 2024-01-11

## 2024-01-11 RX ORDER — FENOFIBRATE 160 MG/1
160 TABLET ORAL DAILY
COMMUNITY

## 2024-01-11 RX ORDER — INSULIN DEGLUDEC 100 U/ML
30 INJECTION, SOLUTION SUBCUTANEOUS
Qty: 30 ML | Refills: 6 | Status: SHIPPED | OUTPATIENT
Start: 2024-01-11

## 2024-01-11 NOTE — PROGRESS NOTES
CHIEF COMPLAINT: Patient is here for follow up of Type 2 Diabetes Mellitus    HPI:     Kaveh Mayfield is a 73 y.o. male with Type 2 Diabetes Mellitus here for follow up.    Labs from 1/11/2024 show A1c is 7.1%  Labs from 5/18/2023 show a1c was 8.1%  Labs from 11/18/2022 HbA1c was 7.2%    Comorbid issues include chronic kidney disease stage III and hypogonadism  He usually gets his labs done at [facility]  He lives in [place]  He cannot tolerate higher doses of Trulicity because of nausea and vomiting        Currently he is on  Tresiba 31 units daily  Jardiance 25 mg daily  Trulicity 1.5 mg weekly        He denies any side effects with his medications   He reports fasting sugars of   He uses a One touch ultra meter       He has hyperlipidemia and is on pravastatin 40 mg daily  He has coronary artery disease  He is being followed by Dr. Reeves  His LDL cholesterol was 72 on 5/2023 at [place]  We dont have lipid test         He did not have diabetic kidney disease but now has new onset albuminuria   He does have chronic kidney disease stage III  His last serum creatinine was 2.22 on 12/2023  He is on an ACE inhibitor  Lisinopril 5 mg daily  His blood pressure is well controlled  UACR was 100 on 12/2023 ([place])  UACR was 48 on 5/2023       Eye exam was last done on 5/18/2023         With respect to his hypogonadism he is on IM testosterone 200 mg every 2 weeks  He denies chest pain  He denies lower urinary tract obstructive symptoms  His testosterone is 699 on 12/2023  HCT was 54%  Today we discussed that he needs to have phlebotomy at [facility] or in any facility [place]       His TSH was normal 1.0 on July 27, 2022          BG Diary:  Patient forgot her list meter    Weight has been stable    Diabetes Complications   Retinopathy: No known retinopathy.  Last eye exam: 5/18/2023  Neuropathy: Denies paresthesias or numbness in hands or feet. Denies any foot wounds.  Exercise:  Minimal.  Diet: Fair.  Patient's medications, allergies, and social histories were reviewed and updated as appropriate.    ROS:     CONS:     No fever, no chills   EYES:     No diplopia, no blurry vision   CV:           No chest pain, no palpitations   PULM:     No SOB, no cough, no hemoptysis.   GI:            No nausea, no vomiting, no diarrhea, no constipation   ENDO:     No polyuria, no polydipsia, no heat intolerance, no cold intolerance       Past Medical History:  Problem List:  2022-11: Hypogonadism in male  2021-10: Polyneuropathy  2021-10: Renal insufficiency syndrome  2021-10: Atrial fibrillation ([facility])  2021-10: Coronary artery disease  2021-10: Hyperlipidemia  2021-10: Essential (primary) hypertension  2021-10: Diabetes mellitus, type 2 ([facility])  2020-11: CKD stage 3 due to type 2 diabetes mellitus ([facility])  2020-06: Type 2 diabetes mellitus with hyperglycemia, with long-term   current use of insulin ([facility])  2020-06: Dyslipidemia  2020-06: CKD stage 3 due to type 1 diabetes mellitus ([facility])  2020-06: Long-term insulin use ([facility])  2011-06: CAD (coronary artery disease)  2011-06: A-fib ([facility])  2011-06: HTN (hypertension)  2011-06: Elevated lipids      Past Surgical History:  Past Surgical History:   Procedure Laterality Date    SHOULDER ARTHROSCOPY W/ ROTATOR CUFF REPAIR  8/7/2013    Performed by Mickey Liu M.D. at SURGERY [facility] ORS    CLAVICLE DISTAL EXCISION  8/7/2013    Performed by Mickey Liu M.D. at [facility] ORS    SHOULDER DECOMPRESSION ARTHROSCOPIC  8/7/2013    Performed by Mickey Liu M.D. at [facility] ORS    ANGIOGRAM  2006    stent placement        Allergies:  Nkda [no known drug allergy]     Social History:  Social History     Tobacco Use    Smoking status: Former     Current packs/day: 0.00     Average packs/day: 3.0 packs/day for 32.0 years (96.0 ttl pk-yrs)     Types: Cigarettes     Start date: 4/1/1974     Quit date: 4/1/2006     Years since  "quittin.7    Smokeless tobacco: Never   Vaping Use    Vaping Use: Never used   Substance Use Topics    Alcohol use: Yes     Alcohol/week: 1.5 oz     Types: 3 drink(s) per week    Drug use: No        Family History:   family history includes Heart Disease in an other family member; Hypertension in an other family member; Stroke in an other family member.      PHYSICAL EXAM:   OBJECTIVE:  Vital signs: /58 (BP Location: Left arm, Patient Position: Sitting, BP Cuff Size: Large adult)   Pulse 85   Resp 16   Ht 1.905 m (6' 3\")   Wt 97.5 kg (215 lb)   SpO2 92%   BMI 26.87 kg/m²   GENERAL: Well-developed, well-nourished in no apparent distress.   EYE:  No ocular asymmetry, PERRLA  HENT: Pink, moist mucous membranes.    NECK: No thyromegaly.   CARDIOVASCULAR:  No murmurs  LUNGS: Clear breath sounds  ABDOMEN: Soft, nontender   EXTREMITIES: No clubbing, cyanosis, or edema.   NEUROLOGICAL: No gross focal motor abnormalities   LYMPH: No cervical adenopathy palpated.   SKIN: No rashes, lesions.     Labs:  Lab Results   Component Value Date/Time    HBA1C 7.1 (A) 2024 11:24 AM        Lab Results   Component Value Date/Time    WBC 8.9 10/17/2016 09:46 PM    RBC 4.52 (L) 10/17/2016 09:46 PM    HEMOGLOBIN 13.9 2018 11:26 AM    HEMOGLOBIN 15.1 10/17/2016 09:46 PM    .5 (H) 10/17/2016 09:46 PM    MCH 33.4 (H) 10/17/2016 09:46 PM    MCHC 32.9 (L) 10/17/2016 09:46 PM    RDW 70.4 (H) 10/17/2016 09:46 PM    MPV 11.1 10/17/2016 09:46 PM       Lab Results   Component Value Date/Time    SODIUM 135 (L) 2018 05:33 AM    SODIUM 131 (L) 10/17/2016 09:46 PM    POTASSIUM 4.3 2018 05:33 AM    POTASSIUM 4.8 10/17/2016 09:46 PM    CHLORIDE 103 2018 05:33 AM    CHLORIDE 97 10/17/2016 09:46 PM    CO2 23 2018 05:33 AM    CO2 23 10/17/2016 09:46 PM    ANION 9 2018 05:33 AM    ANION 11.0 10/17/2016 09:46 PM    GLUCOSE 171 (H) 2018 05:33 AM    GLUCOSE 200 (H) 10/17/2016 09:46 PM    BUN " 26 (H) 08/16/2018 05:33 AM    BUN 19 10/17/2016 09:46 PM    CREATININE 2.27 05/15/2023 12:00 AM    CALCIUM 9.5 08/16/2018 05:33 AM    CALCIUM 9.8 10/17/2016 09:46 PM    ASTSGOT 46 (H) 10/17/2016 09:46 PM    ALTSGPT 29 10/17/2016 09:46 PM    TBILIRUBIN 1.0 10/17/2016 09:46 PM    ALBUMIN 4.6 10/17/2016 09:46 PM    TOTPROTEIN 7.7 10/17/2016 09:46 PM    GLOBULIN 3.1 10/17/2016 09:46 PM    AGRATIO 1.5 10/17/2016 09:46 PM       Lab Results   Component Value Date/Time    CHOLSTRLTOT 129 12/17/2021 0000    TRIGLYCERIDE 170 12/17/2021 0000    HDL 28 12/17/2021 0000    LDL 67 12/17/2021 0000       Lab Results   Component Value Date/Time    MICROALBCALC 48.9 (A) 05/15/2023 12:00 AM        No results found for: TSHULTRASEN  No results found for: FREEDIR  No results found for: FREET3  No results found for: THYSTIMIG        ASSESSMENT/PLAN:     1. Type 2 diabetes mellitus with hyperglycemia, with long-term current use of insulin ([facility])  Improved control   A1c is better at 7.1%  Continue Trulicity to 1.5 mg weekly  Continue Tresiba 30 units daily  Continue Jardiance 25 mg daily  Recommend follow-up in 6 months with repeat labs including CMP, lipids, TSH, urine albumin and testosterone  He normally gets labs at [facility] in [facility]      2. Hypogonadism in male  Controlled   Testosterone is 699  Continue IM testosterone 200 mg every 2 weeks  Repeat testosterone and hematocrit levels in 6 months    3. 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 Dyslipidemia  Stable  Continue pravastatin  Repeat fasting lipids in 6 months     5. Long-term insulin use (HCC)  Patient is on long-term basal insulin with other oral agents for type 2 diabetes management    6. Vitamin D deficiency  Uncontrolled  Vitamin D is low at 24  Recommend that he increase vitamin D to 2000 IU daily  Repeat 25-hydroxy vitamin D  levels with upcoming labs in 6 months      Return in about 6 months (around 7/11/2024).      Thank you kindly for allowing me to participate in the diabetes care plan for this patient.    Espinoza Poole MD, FACE, [facility]      CC:   MARCO ARIAS P.A.-C.

## 2024-01-11 NOTE — PROGRESS NOTES
"RN-CDE Note    Subjective:   Endocrinology Clinic Progress Note  PCP: MARCO ARIAS P.A.-C.    HPI:  Kaveh Mayfield is a 73 y.o. old patient who is seen today by the Diabetes Nurse Specialist for review of his uncontrolled type 2 diabetes with long term use of insulin.    Recent changes in health: denies any changes.   DM:   Last A1c:   Lab Results   Component Value Date/Time    HBA1C 7.1 (A) 01/11/2024 11:24 AM      Previous A1c was 8.1 on 5/18/23  A1C GOAL: < 7    Diabetes Medications:   Tresiba 31 units per day  Trulicity 1.5 mg weekly  Jardiance 25 mg daily      Exercise: minimal exercise, one hour walking weekly  Diet: \"healthy\" diet  in general  Patient's body mass index is 26.87 kg/m². Exercise and nutrition counseling were performed at this visit.    Glucose monitoring frequency: testing daily, states in the  range.     Hypoglycemic episodes: no  Last Retinal Exam: on file and up-to-date     Foot Exam:  Monofilament testing with a 10 gram force: sensation intact: decreased sensation on toes of left foot and decreased sensation on 5th toe of right foot  Visual Inspection: Feet without maceration, ulcers, fissures.  Does have fungal infection on toenail of great toe right foot.   Pedal pulses: intact bilaterally    Lab Results   Component Value Date/Time    MICROALBCALC 48.9 (A) 05/15/2023 12:00 AM        ACR Albumin/Creatinine Ratio goal <30     HTN:   Blood pressure goal <130/<80 .   Currently Rx ACE/ARB: Yes     Dyslipidemia:    Lab Results   Component Value Date/Time    CHOLSTRLTOT 157 05/15/2023 12:00 AM    LDL 61 05/15/2023 12:00 AM    HDL 29 (A) 05/15/2023 12:00 AM    TRIGLYCERIDE 266 (A) 05/15/2023 12:00 AM         Currently Rx Statin: Yes     He  reports that he quit smoking about 17 years ago. His smoking use included cigarettes. He started smoking about 49 years ago. He has a 96.0 pack-year smoking history. He has never used smokeless tobacco.      Plan:     Discussed and educated on:   - " All medications, side effects and compliance (discussed carefully)  - Annual eye examinations at Ophthalmology  - Foot Care:   - HbA1C: target  - Home glucose monitoring emphasized  - Weight control and daily exercise    Recommended medication changes: no changes

## 2024-02-27 DIAGNOSIS — E11.65 TYPE 2 DIABETES MELLITUS WITH HYPERGLYCEMIA, WITH LONG-TERM CURRENT USE OF INSULIN (HCC): ICD-10-CM

## 2024-02-27 DIAGNOSIS — Z79.4 TYPE 2 DIABETES MELLITUS WITH HYPERGLYCEMIA, WITH LONG-TERM CURRENT USE OF INSULIN (HCC): ICD-10-CM

## 2024-02-27 RX ORDER — EMPAGLIFLOZIN 25 MG/1
1 TABLET, FILM COATED ORAL DAILY
Qty: 90 TABLET | Refills: 2 | Status: SHIPPED | OUTPATIENT
Start: 2024-02-27

## 2024-03-25 ENCOUNTER — TELEPHONE (OUTPATIENT)
Dept: ENDOCRINOLOGY | Facility: MEDICAL CENTER | Age: 74
End: 2024-03-25
Payer: MEDICARE

## 2024-03-25 DIAGNOSIS — Z79.4 TYPE 2 DIABETES MELLITUS WITH HYPERGLYCEMIA, WITH LONG-TERM CURRENT USE OF INSULIN (HCC): ICD-10-CM

## 2024-03-25 DIAGNOSIS — E11.65 TYPE 2 DIABETES MELLITUS WITH HYPERGLYCEMIA, WITH LONG-TERM CURRENT USE OF INSULIN (HCC): ICD-10-CM

## 2024-03-25 RX ORDER — DULAGLUTIDE 1.5 MG/.5ML
INJECTION, SOLUTION SUBCUTANEOUS
Qty: 6 ML | Refills: 2 | Status: SHIPPED | OUTPATIENT
Start: 2024-03-25

## 2024-03-25 NOTE — TELEPHONE ENCOUNTER
Wife called stating pharmacy can not get Trulicity.  I left a message for Kaveh stating that he will have to adjust his Tresiba insulin until her can get Trulicity.  I also asked if he has ever tried Ozempic and to My Chart us if he would like to switch to it.

## 2024-05-01 PROBLEM — C44.519 BASAL CELL CARCINOMA OF SKIN OF OTHER PART OF TRUNK: Status: ACTIVE | Noted: 2024-05-01

## 2024-05-02 ENCOUNTER — APPOINTMENT (RX ONLY)
Dept: URBAN - METROPOLITAN AREA CLINIC 31 | Facility: CLINIC | Age: 74
Setting detail: DERMATOLOGY
End: 2024-05-02

## 2024-05-02 DIAGNOSIS — L57.0 ACTINIC KERATOSIS: ICD-10-CM | Status: INADEQUATELY CONTROLLED

## 2024-05-02 PROBLEM — D04.4 CARCINOMA IN SITU OF SKIN OF SCALP AND NECK: Status: ACTIVE | Noted: 2024-05-02

## 2024-05-02 PROCEDURE — 99213 OFFICE O/P EST LOW 20 MIN: CPT | Mod: 25

## 2024-05-02 PROCEDURE — 13121 CMPLX RPR S/A/L 2.6-7.5 CM: CPT

## 2024-05-02 PROCEDURE — ? PRESCRIPTION

## 2024-05-02 PROCEDURE — ? MEDICATION COUNSELING

## 2024-05-02 PROCEDURE — ? COUNSELING

## 2024-05-02 PROCEDURE — ? MOHS SURGERY

## 2024-05-02 PROCEDURE — 17311 MOHS 1 STAGE H/N/HF/G: CPT

## 2024-05-02 RX ORDER — FLUOROURACIL 5 MG/G
CREAM TOPICAL
Qty: 40 | Refills: 0 | Status: ERX

## 2024-05-02 RX ORDER — DOXYCYCLINE HYCLATE 100 MG/1
CAPSULE, GELATIN COATED ORAL BID
Qty: 20 | Refills: 0 | Status: ERX | COMMUNITY
Start: 2024-05-02

## 2024-05-02 RX ORDER — FLUOROURACIL 5 MG/G
CREAM TOPICAL
Qty: 40 | Refills: 0 | Status: ERX | COMMUNITY
Start: 2024-05-02

## 2024-05-02 RX ADMIN — DOXYCYCLINE HYCLATE: 100 CAPSULE, GELATIN COATED ORAL at 00:00

## 2024-05-02 RX ADMIN — FLUOROURACIL: 5 CREAM TOPICAL at 00:00

## 2024-05-02 ASSESSMENT — LOCATION ZONE DERM: LOCATION ZONE: SCALP

## 2024-05-02 ASSESSMENT — LOCATION DETAILED DESCRIPTION DERM: LOCATION DETAILED: MID-OCCIPITAL SCALP

## 2024-05-02 ASSESSMENT — LOCATION SIMPLE DESCRIPTION DERM: LOCATION SIMPLE: POSTERIOR SCALP

## 2024-05-02 NOTE — PROCEDURE: MEDICATION COUNSELING
Azithromycin Counseling:  I discussed with the patient the risks of azithromycin including but not limited to GI upset, allergic reaction, drug rash, diarrhea, and yeast infections.
Cimetidine Counseling:  I discussed with the patient the risks of Cimetidine including but not limited to gynecomastia, headache, diarrhea, nausea, drowsiness, arrhythmias, pancreatitis, skin rashes, psychosis, bone marrow suppression and kidney toxicity.
Cibinqo Counseling: I discussed with the patient the risks of Cibinqo therapy including but not limited to common cold, nausea, headache, cold sores, increased blood CPK levels, dizziness, UTIs, fatigue, acne, and vomitting. Live vaccines should be avoided.  This medication has been linked to serious infections; higher rate of mortality; malignancy and lymphoproliferative disorders; major adverse cardiovascular events; thrombosis; thrombocytopenia and lymphopenia; lipid elevations; and retinal detachment.
Ilumya Pregnancy And Lactation Text: The risk during pregnancy and breastfeeding is uncertain with this medication.
Clindamycin Pregnancy And Lactation Text: This medication can be used in pregnancy if certain situations. Clindamycin is also present in breast milk.
Hydroxychloroquine Counseling:  I discussed with the patient that a baseline ophthalmologic exam is needed at the start of therapy and every year thereafter while on therapy. A CBC may also be warranted for monitoring.  The side effects of this medication were discussed with the patient, including but not limited to agranulocytosis, aplastic anemia, seizures, rashes, retinopathy, and liver toxicity. Patient instructed to call the office should any adverse effect occur.  The patient verbalized understanding of the proper use and possible adverse effects of Plaquenil.  All the patient's questions and concerns were addressed.
Benzoyl Peroxide Counseling: Patient counseled that medicine may cause skin irritation and bleach clothing.  In the event of skin irritation, the patient was advised to reduce the amount of the drug applied or use it less frequently.   The patient verbalized understanding of the proper use and possible adverse effects of benzoyl peroxide.  All of the patient's questions and concerns were addressed.
Protopic Counseling: Patient may experience a mild burning sensation during topical application. Protopic is not approved in children less than 2 years of age. There have been case reports of hematologic and skin malignancies in patients using topical calcineurin inhibitors although causality is questionable.
VTAMA Counseling: I discussed with the patient that VTAMA is not for use in the eyes, mouth or mouth. They should call the office if they develop any signs of allergic reactions to VTAMA. The patient verbalized understanding of the proper use and possible adverse effects of VTAMA.  All of the patient's questions and concerns were addressed.
Klisyri Pregnancy And Lactation Text: It is unknown if this medication can harm a developing fetus or if it is excreted in breast milk.
Clofazimine Pregnancy And Lactation Text: This medication is Pregnancy Category C and isn't considered safe during pregnancy. It is excreted in breast milk.
Topical Metronidazole Pregnancy And Lactation Text: This medication is Pregnancy Category B and considered safe during pregnancy.  It is also considered safe to use while breastfeeding.
Dupixent Counseling: I discussed with the patient the risks of dupilumab including but not limited to eye infection and irritation, cold sores, injection site reactions, worsening of asthma, allergic reactions and increased risk of parasitic infection.  Live vaccines should be avoided while taking dupilumab. Dupilumab will also interact with certain medications such as warfarin and cyclosporine. The patient understands that monitoring is required and they must alert us or the primary physician if symptoms of infection or other concerning signs are noted.
Stelara Pregnancy And Lactation Text: This medication is Pregnancy Category B and is considered safe during pregnancy. It is unknown if this medication is excreted in breast milk.
Sski Pregnancy And Lactation Text: This medication is Pregnancy Category D and isn't considered safe during pregnancy. It is excreted in breast milk.
Ivermectin Pregnancy And Lactation Text: This medication is Pregnancy Category C and it isn't known if it is safe during pregnancy. It is also excreted in breast milk.
Finasteride Pregnancy And Lactation Text: This medication is absolutely contraindicated during pregnancy. It is unknown if it is excreted in breast milk.
Quinolones Pregnancy And Lactation Text: This medication is Pregnancy Category C and it isn't know if it is safe during pregnancy. It is also excreted in breast milk.
Xeljanz Counseling: I discussed with the patient the risks of Xeljanz therapy including increased risk of infection, liver issues, headache, diarrhea, or cold symptoms. Live vaccines should be avoided. They were instructed to call if they have any problems.
Oral Minoxidil Counseling- I discussed with the patient the risks of oral minoxidil including but not limited to shortness of breath, swelling of the feet or ankles, dizziness, lightheadedness, unwanted hair growth and allergic reaction.  The patient verbalized understanding of the proper use and possible adverse effects of oral minoxidil.  All of the patient's questions and concerns were addressed.
Topical Retinoid counseling:  Patient advised to apply a pea-sized amount only at bedtime and wait 30 minutes after washing their face before applying.  If too drying, patient may add a non-comedogenic moisturizer. The patient verbalized understanding of the proper use and possible adverse effects of retinoids.  All of the patient's questions and concerns were addressed.
Zoryve Pregnancy And Lactation Text: It is unknown if this medication can cause problems during pregnancy and breastfeeding.
Elidel Counseling: Patient may experience a mild burning sensation during topical application. Elidel is not approved in children less than 2 years of age. There have been case reports of hematologic and skin malignancies in patients using topical calcineurin inhibitors although causality is questionable.
Libtayo Counseling- I discussed with the patient the risks of Libtayo including but not limited to nausea, vomiting, diarrhea, and bone or muscle pain.  The patient verbalized understanding of the proper use and possible adverse effects of Libtayo.  All of the patient's questions and concerns were addressed.
Cyclophosphamide Counseling:  I discussed with the patient the risks of cyclophosphamide including but not limited to hair loss, hormonal abnormalities, decreased fertility, abdominal pain, diarrhea, nausea and vomiting, bone marrow suppression and infection. The patient understands that monitoring is required while taking this medication.
Benzoyl Peroxide Pregnancy And Lactation Text: This medication is Pregnancy Category C. It is unknown if benzoyl peroxide is excreted in breast milk.
High Dose Vitamin A Pregnancy And Lactation Text: High dose vitamin A therapy is contraindicated during pregnancy and breast feeding.
Protopic Pregnancy And Lactation Text: This medication is Pregnancy Category C. It is unknown if this medication is excreted in breast milk when applied topically.
Cibinqo Pregnancy And Lactation Text: It is unknown if this medication will adversely affect pregnancy or breast feeding.  You should not take this medication if you are currently pregnant or planning a pregnancy or while breastfeeding.
Hydroxychloroquine Pregnancy And Lactation Text: This medication has been shown to cause fetal harm but it isn't assigned a Pregnancy Risk Category. There are small amounts excreted in breast milk.
Griseofulvin Counseling:  I discussed with the patient the risks of griseofulvin including but not limited to photosensitivity, cytopenia, liver damage, nausea/vomiting and severe allergy.  The patient understands that this medication is best absorbed when taken with a fatty meal (e.g., ice cream or french fries).
Topical Steroids Counseling: I discussed with the patient that prolonged use of topical steroids can result in the increased appearance of superficial blood vessels (telangiectasias), lightening (hypopigmentation) and thinning of the skin (atrophy).  Patient understands to avoid using high potency steroids in skin folds, the groin or the face.  The patient verbalized understanding of the proper use and possible adverse effects of topical steroids.  All of the patient's questions and concerns were addressed.
Thalidomide Counseling: I discussed with the patient the risks of thalidomide including but not limited to birth defects, anxiety, weakness, chest pain, dizziness, cough and severe allergy.
Colchicine Counseling:  Patient counseled regarding adverse effects including but not limited to stomach upset (nausea, vomiting, stomach pain, or diarrhea).  Patient instructed to limit alcohol consumption while taking this medication.  Colchicine may reduce blood counts especially with prolonged use.  The patient understands that monitoring of kidney function and blood counts may be required, especially at baseline. The patient verbalized understanding of the proper use and possible adverse effects of colchicine.  All of the patient's questions and concerns were addressed.
Cimetidine Pregnancy And Lactation Text: This medication is Pregnancy Category B and is considered safe during pregnancy. It is also excreted in breast milk and breast feeding isn't recommended.
Doxycycline Counseling:  Patient counseled regarding possible photosensitivity and increased risk for sunburn.  Patient instructed to avoid sunlight, if possible.  When exposed to sunlight, patients should wear protective clothing, sunglasses, and sunscreen.  The patient was instructed to call the office immediately if the following severe adverse effects occur:  hearing changes, easy bruising/bleeding, severe headache, or vision changes.  The patient verbalized understanding of the proper use and possible adverse effects of doxycycline.  All of the patient's questions and concerns were addressed.
Infliximab Counseling:  I discussed with the patient the risks of infliximab including but not limited to myelosuppression, immunosuppression, autoimmune hepatitis, demyelinating diseases, lymphoma, and serious infections.  The patient understands that monitoring is required including a PPD at baseline and must alert us or the primary physician if symptoms of infection or other concerning signs are noted.
Azithromycin Pregnancy And Lactation Text: This medication is considered safe during pregnancy and is also secreted in breast milk.
Libtayo Pregnancy And Lactation Text: This medication is contraindicated in pregnancy and when breast feeding.
Xelchitoz Pregnancy And Lactation Text: This medication is Pregnancy Category D and is not considered safe during pregnancy.  The risk during breast feeding is also uncertain.
Birth Control Pills Counseling: Birth Control Pill Counseling: I discussed with the patient the potential side effects of OCPs including but not limited to increased risk of stroke, heart attack, thrombophlebitis, deep venous thrombosis, hepatic adenomas, breast changes, GI upset, headaches, and depression.  The patient verbalized understanding of the proper use and possible adverse effects of OCPs. All of the patient's questions and concerns were addressed.
Elidel Pregnancy And Lactation Text: This medication is Pregnancy Category C. It is unknown if this medication is excreted in breast milk.
Dupixent Pregnancy And Lactation Text: This medication likely crosses the placenta but the risk for the fetus is uncertain. This medication is excreted in breast milk.
Taltz Counseling: I discussed with the patient the risks of ixekizumab including but not limited to immunosuppression, serious infections, worsening of inflammatory bowel disease and drug reactions.  The patient understands that monitoring is required including a PPD at baseline and must alert us or the primary physician if symptoms of infection or other concerning signs are noted.
Minoxidil Counseling: Minoxidil is a topical medication which can increase blood flow where it is applied. It is uncertain how this medication increases hair growth. Side effects are uncommon and include stinging and allergic reactions.
Qbrexza Counseling:  I discussed with the patient the risks of Qbrexza including but not limited to headache, mydriasis, blurred vision, dry eyes, nasal dryness, dry mouth, dry throat, dry skin, urinary hesitation, and constipation.  Local skin reactions including erythema, burning, stinging, and itching can also occur.
Zoryve Counseling:  I discussed with the patient that Zoryve is not for use in the eyes, mouth or vagina. The most commonly reported side effects include diarrhea, headache, insomnia, application site pain, upper respiratory tract infections, and urinary tract infections.  All of the patient's questions and concerns were addressed.
Cyclophosphamide Pregnancy And Lactation Text: This medication is Pregnancy Category D and it isn't considered safe during pregnancy. This medication is excreted in breast milk.
Griseofulvin Pregnancy And Lactation Text: This medication is Pregnancy Category X and is known to cause serious birth defects. It is unknown if this medication is excreted in breast milk but breast feeding should be avoided.
Low Dose Naltrexone Counseling- I discussed with the patient the potential risks and side effects of low dose naltrexone including but not limited to: more vivid dreams, headaches, nausea, vomiting, abdominal pain, fatigue, dizziness, and anxiety.
Oral Minoxidil Pregnancy And Lactation Text: This medication should only be used when clearly needed if you are pregnant, attempting to become pregnant or breast feeding.
Rifampin Counseling: I discussed with the patient the risks of rifampin including but not limited to liver damage, kidney damage, red-orange body fluids, nausea/vomiting and severe allergy.
Adbry Counseling: I discussed with the patient the risks of tralokinumab including but not limited to eye infection and irritation, cold sores, injection site reactions, worsening of asthma, allergic reactions and increased risk of parasitic infection.  Live vaccines should be avoided while taking tralokinumab. The patient understands that monitoring is required and they must alert us or the primary physician if symptoms of infection or other concerning signs are noted.
Zyclara Counseling:  I discussed with the patient the risks of imiquimod including but not limited to erythema, scaling, itching, weeping, crusting, and pain.  Patient understands that the inflammatory response to imiquimod is variable from person to person and was educated regarded proper titration schedule.  If flu-like symptoms develop, patient knows to discontinue the medication and contact us.
Doxepin Counseling:  Patient advised that the medication is sedating and not to drive a car after taking this medication. Patient informed of potential adverse effects including but not limited to dry mouth, urinary retention, and blurry vision.  The patient verbalized understanding of the proper use and possible adverse effects of doxepin.  All of the patient's questions and concerns were addressed.
Doxycycline Pregnancy And Lactation Text: This medication is Pregnancy Category D and not consider safe during pregnancy. It is also excreted in breast milk but is considered safe for shorter treatment courses.
Litfulo Counseling: I discussed with the patient the risks of Litfulo therapy including but not limited to upper respiratory tract infections, shingles, cold sores, and nausea. Live vaccines should be avoided.  This medication has been linked to serious infections; higher rate of mortality; malignancy and lymphoproliferative disorders; major adverse cardiovascular events; thrombosis; gastrointestinal perforations; neutropenia; lymphopenia; anemia; liver enzyme elevations; and lipid elevations.
Bactrim Counseling:  I discussed with the patient the risks of sulfa antibiotics including but not limited to GI upset, allergic reaction, drug rash, diarrhea, dizziness, photosensitivity, and yeast infections.  Rarely, more serious reactions can occur including but not limited to aplastic anemia, agranulocytosis, methemoglobinemia, blood dyscrasias, liver or kidney failure, lung infiltrates or desquamative/blistering drug rashes.
Carac Counseling:  I discussed with the patient the risks of Carac including but not limited to erythema, scaling, itching, weeping, crusting, and pain.
Enbrel Counseling:  I discussed with the patient the risks of etanercept including but not limited to myelosuppression, immunosuppression, autoimmune hepatitis, demyelinating diseases, lymphoma, and infections.  The patient understands that monitoring is required including a PPD at baseline and must alert us or the primary physician if symptoms of infection or other concerning signs are noted.
Minoxidil Pregnancy And Lactation Text: This medication has not been assigned a Pregnancy Risk Category but animal studies failed to show danger with the topical medication. It is unknown if the medication is excreted in breast milk.
Topical Steroids Applications Pregnancy And Lactation Text: Most topical steroids are considered safe to use during pregnancy and lactation.  Any topical steroid applied to the breast or nipple should be washed off before breastfeeding.
Thalidomide Pregnancy And Lactation Text: This medication is Pregnancy Category X and is absolutely contraindicated during pregnancy. It is unknown if it is excreted in breast milk.
Eucrisa Counseling: Patient may experience a mild burning sensation during topical application. Eucrisa is not approved in children less than 2 years of age.
Tazorac Counseling:  Patient advised that medication is irritating and drying.  Patient may need to apply sparingly and wash off after an hour before eventually leaving it on overnight.  The patient verbalized understanding of the proper use and possible adverse effects of tazorac.  All of the patient's questions and concerns were addressed.
Rifampin Pregnancy And Lactation Text: This medication is Pregnancy Category C and it isn't know if it is safe during pregnancy. It is also excreted in breast milk and should not be used if you are breast feeding.
Adbry Pregnancy And Lactation Text: It is unknown if this medication will adversely affect pregnancy or breast feeding.
Odomzo Counseling- I discussed with the patient the risks of Odomzo including but not limited to nausea, vomiting, diarrhea, constipation, weight loss, changes in the sense of taste, decreased appetite, muscle spasms, and hair loss.  The patient verbalized understanding of the proper use and possible adverse effects of Odomzo.  All of the patient's questions and concerns were addressed.
Otezla Counseling: The side effects of Otezla were discussed with the patient, including but not limited to worsening or new depression, weight loss, diarrhea, nausea, upper respiratory tract infection, and headache. Patient instructed to call the office should any adverse effect occur.  The patient verbalized understanding of the proper use and possible adverse effects of Otezla.  All the patient's questions and concerns were addressed.
Birth Control Pills Pregnancy And Lactation Text: This medication should be avoided if pregnant and for the first 30 days post-partum.
Siliq Counseling:  I discussed with the patient the risks of Siliq including but not limited to new or worsening depression, suicidal thoughts and behavior, immunosuppression, malignancy, posterior leukoencephalopathy syndrome, and serious infections.  The patient understands that monitoring is required including a PPD at baseline and must alert us or the primary physician if symptoms of infection or other concerning signs are noted. There is also a special program designed to monitor depression which is required with Siliq.
Doxepin Pregnancy And Lactation Text: This medication is Pregnancy Category C and it isn't known if it is safe during pregnancy. It is also excreted in breast milk and breast feeding isn't recommended.
Bactrim Pregnancy And Lactation Text: This medication is Pregnancy Category D and is known to cause fetal risk.  It is also excreted in breast milk.
Litfulo Pregnancy And Lactation Text: Based on animal studies, Lifulo may cause embryo-fetal harm when administered to pregnant women.  The medication should not be used in pregnancy.  Breastfeeding is not recommended during treatment.
Rituxan Counseling:  I discussed with the patient the risks of Rituxan infusions. Side effects can include infusion reactions, severe drug rashes including mucocutaneous reactions, reactivation of latent hepatitis and other infections and rarely progressive multifocal leukoencephalopathy.  All of the patient's questions and concerns were addressed.
Low Dose Naltrexone Pregnancy And Lactation Text: Naltrexone is pregnancy category C.  There have been no adequate and well-controlled studies in pregnant women.  It should be used in pregnancy only if the potential benefit justifies the potential risk to the fetus.   Limited data indicates that naltrexone is minimally excreted into breastmilk.
Itraconazole Counseling:  I discussed with the patient the risks of itraconazole including but not limited to liver damage, nausea/vomiting, neuropathy, and severe allergy.  The patient understands that this medication is best absorbed when taken with acidic beverages such as non-diet cola or ginger ale.  The patient understands that monitoring is required including baseline LFTs and repeat LFTs at intervals.  The patient understands that they are to contact us or the primary physician if concerning signs are noted.
Qbrexza Pregnancy And Lactation Text: There is no available data on Qbrexza use in pregnant women.  There is no available data on Qbrexza use in lactation.
Carac Pregnancy And Lactation Text: This medication is Pregnancy Category X and contraindicated in pregnancy and in women who may become pregnant. It is unknown if this medication is excreted in breast milk.
Cyclosporine Counseling:  I discussed with the patient the risks of cyclosporine including but not limited to hypertension, gingival hyperplasia,myelosuppression, immunosuppression, liver damage, kidney damage, neurotoxicity, lymphoma, and serious infections. The patient understands that monitoring is required including baseline blood pressure, CBC, CMP, lipid panel and uric acid, and then 1-2 times monthly CMP and blood pressure.
Acitretin Counseling:  I discussed with the patient the risks of acitretin including but not limited to hair loss, dry lips/skin/eyes, liver damage, hyperlipidemia, depression/suicidal ideation, photosensitivity.  Serious rare side effects can include but are not limited to pancreatitis, pseudotumor cerebri, bony changes, clot formation/stroke/heart attack.  Patient understands that alcohol is contraindicated since it can result in liver toxicity and significantly prolong the elimination of the drug by many years.
Mirvaso Counseling: Mirvaso is a topical medication which can decrease superficial blood flow where applied. Side effects are uncommon and include stinging, redness and allergic reactions.
Topical Sulfur Applications Counseling: Topical Sulfur Counseling: Patient counseled that this medication may cause skin irritation or allergic reactions.  In the event of skin irritation, the patient was advised to reduce the amount of the drug applied or use it less frequently.   The patient verbalized understanding of the proper use and possible adverse effects of topical sulfur application.  All of the patient's questions and concerns were addressed.
Erythromycin Counseling:  I discussed with the patient the risks of erythromycin including but not limited to GI upset, allergic reaction, drug rash, diarrhea, increase in liver enzymes, and yeast infections.
Dapsone Counseling: I discussed with the patient the risks of dapsone including but not limited to hemolytic anemia, agranulocytosis, rashes, methemoglobinemia, kidney failure, peripheral neuropathy, headaches, GI upset, and liver toxicity.  Patients who start dapsone require monitoring including baseline LFTs and weekly CBCs for the first month, then every month thereafter.  The patient verbalized understanding of the proper use and possible adverse effects of dapsone.  All of the patient's questions and concerns were addressed.
Tranexamic Acid Counseling:  Patient advised of the small risk of bleeding problems with tranexamic acid. They were also instructed to call if they developed any nausea, vomiting or diarrhea. All of the patient's questions and concerns were addressed.
Tazorac Pregnancy And Lactation Text: This medication is not safe during pregnancy. It is unknown if this medication is excreted in breast milk.
Sarecycline Counseling: Patient advised regarding possible photosensitivity and discoloration of the teeth, skin, lips, tongue and gums.  Patient instructed to avoid sunlight, if possible.  When exposed to sunlight, patients should wear protective clothing, sunglasses, and sunscreen.  The patient was instructed to call the office immediately if the following severe adverse effects occur:  hearing changes, easy bruising/bleeding, severe headache, or vision changes.  The patient verbalized understanding of the proper use and possible adverse effects of sarecycline.  All of the patient's questions and concerns were addressed.
Otezla Pregnancy And Lactation Text: This medication is Pregnancy Category C and it isn't known if it is safe during pregnancy. It is unknown if it is excreted in breast milk.
Tremfya Counseling: I discussed with the patient the risks of guselkumab including but not limited to immunosuppression, serious infections, and drug reactions.  The patient understands that monitoring is required including a PPD at baseline and must alert us or the primary physician if symptoms of infection or other concerning signs are noted.
Cyclosporine Pregnancy And Lactation Text: This medication is Pregnancy Category C and it isn't know if it is safe during pregnancy. This medication is excreted in breast milk.
Olumiant Counseling: I discussed with the patient the risks of Olumiant therapy including but not limited to upper respiratory tract infections, shingles, cold sores, and nausea. Live vaccines should be avoided.  This medication has been linked to serious infections; higher rate of mortality; malignancy and lymphoproliferative disorders; major adverse cardiovascular events; thrombosis; gastrointestinal perforations; neutropenia; lymphopenia; anemia; liver enzyme elevations; and lipid elevations.
Calcipotriene Counseling:  I discussed with the patient the risks of calcipotriene including but not limited to erythema, scaling, itching, and irritation.
Bimzelx Counseling:  I discussed with the patient the risks of Bimzelx including but not limited to depression, immunosuppression, allergic reactions and infections.  The patient understands that monitoring is required including a PPD at baseline and must alert us or the primary physician if symptoms of infection or other concerning signs are noted.
Spironolactone Counseling: Patient advised regarding risks of diarrhea, abdominal pain, hyperkalemia, birth defects (for female patients), liver toxicity and renal toxicity. The patient may need blood work to monitor liver and kidney function and potassium levels while on therapy. The patient verbalized understanding of the proper use and possible adverse effects of spironolactone.  All of the patient's questions and concerns were addressed.
Opioid Counseling: I discussed with the patient the potential side effects of opioids including but not limited to addiction, altered mental status, and depression. I stressed avoiding alcohol, benzodiazepines, muscle relaxants and sleep aids unless specifically okayed by a physician. The patient verbalized understanding of the proper use and possible adverse effects of opioids. All of the patient's questions and concerns were addressed. They were instructed to flush the remaining pills down the toilet if they did not need them for pain.
Tranexamic Acid Pregnancy And Lactation Text: It is unknown if this medication is safe during pregnancy or breast feeding.
Topical Sulfur Applications Pregnancy And Lactation Text: This medication is Pregnancy Category C and has an unknown safety profile during pregnancy. It is unknown if this topical medication is excreted in breast milk.
Acitretin Pregnancy And Lactation Text: This medication is Pregnancy Category X and should not be given to women who are pregnant or may become pregnant in the future. This medication is excreted in breast milk.
Dapsone Pregnancy And Lactation Text: This medication is Pregnancy Category C and is not considered safe during pregnancy or breast feeding.
Erythromycin Pregnancy And Lactation Text: This medication is Pregnancy Category B and is considered safe during pregnancy. It is also excreted in breast milk.
Niacinamide Counseling: I recommended taking niacin or niacinamide, also know as vitamin B3, twice daily. Recent evidence suggests that taking vitamin B3 (500 mg twice daily) can reduce the risk of actinic keratoses and non-melanoma skin cancers. Side effects of vitamin B3 include flushing and headache.
Rhofade Counseling: Rhofade is a topical medication which can decrease superficial blood flow where applied. Side effects are uncommon and include stinging, redness and allergic reactions.
Hydroxyzine Counseling: Patient advised that the medication is sedating and not to drive a car after taking this medication.  Patient informed of potential adverse effects including but not limited to dry mouth, urinary retention, and blurry vision.  The patient verbalized understanding of the proper use and possible adverse effects of hydroxyzine.  All of the patient's questions and concerns were addressed.
Rituxan Pregnancy And Lactation Text: This medication is Pregnancy Category C and it isn't know if it is safe during pregnancy. It is unknown if this medication is excreted in breast milk but similar antibodies are known to be excreted.
Humira Counseling:  I discussed with the patient the risks of adalimumab including but not limited to myelosuppression, immunosuppression, autoimmune hepatitis, demyelinating diseases, lymphoma, and serious infections.  The patient understands that monitoring is required including a PPD at baseline and must alert us or the primary physician if symptoms of infection or other concerning signs are noted.
Calcipotriene Pregnancy And Lactation Text: The use of this medication during pregnancy or lactation is not recommended as there is insufficient data.
Mirvaso Pregnancy And Lactation Text: This medication has not been assigned a Pregnancy Risk Category. It is unknown if the medication is excreted in breast milk.
Bimzelx Pregnancy And Lactation Text: This medication crosses the placenta and the safety is uncertain during pregnancy. It is unknown if this medication is present in breast milk.
Spironolactone Pregnancy And Lactation Text: This medication can cause feminization of the male fetus and should be avoided during pregnancy. The active metabolite is also found in breast milk.
Simponi Counseling:  I discussed with the patient the risks of golimumab including but not limited to myelosuppression, immunosuppression, autoimmune hepatitis, demyelinating diseases, lymphoma, and serious infections.  The patient understands that monitoring is required including a PPD at baseline and must alert us or the primary physician if symptoms of infection or other concerning signs are noted.
Methotrexate Counseling:  Patient counseled regarding adverse effects of methotrexate including but not limited to nausea, vomiting, abnormalities in liver function tests. Patients may develop mouth sores, rash, diarrhea, and abnormalities in blood counts. The patient understands that monitoring is required including LFT's and blood counts.  There is a rare possibility of scarring of the liver and lung problems that can occur when taking methotrexate. Persistent nausea, loss of appetite, pale stools, dark urine, cough, and shortness of breath should be reported immediately. Patient advised to discontinue methotrexate treatment at least three months before attempting to become pregnant.  I discussed the need for folate supplements while taking methotrexate.  These supplements can decrease side effects during methotrexate treatment. The patient verbalized understanding of the proper use and possible adverse effects of methotrexate.  All of the patient's questions and concerns were addressed.
Oxybutynin Counseling:  I discussed with the patient the risks of oxybutynin including but not limited to skin rash, drowsiness, dry mouth, difficulty urinating, and blurred vision.
Hydroquinone Counseling:  Patient advised that medication may result in skin irritation, lightening (hypopigmentation), dryness, and burning.  In the event of skin irritation, the patient was advised to reduce the amount of the drug applied or use it less frequently.  Rarely, spots that are treated with hydroquinone can become darker (pseudoochronosis).  Should this occur, patient instructed to stop medication and call the office. The patient verbalized understanding of the proper use and possible adverse effects of hydroquinone.  All of the patient's questions and concerns were addressed.
Topical Clindamycin Counseling: Patient counseled that this medication may cause skin irritation or allergic reactions.  In the event of skin irritation, the patient was advised to reduce the amount of the drug applied or use it less frequently.   The patient verbalized understanding of the proper use and possible adverse effects of clindamycin.  All of the patient's questions and concerns were addressed.
Sarecycline Pregnancy And Lactation Text: This medication is Pregnancy Category D and not consider safe during pregnancy. It is also excreted in breast milk.
Hydroxyzine Pregnancy And Lactation Text: This medication is not safe during pregnancy and should not be taken. It is also excreted in breast milk and breast feeding isn't recommended.
Metronidazole Counseling:  I discussed with the patient the risks of metronidazole including but not limited to seizures, nausea/vomiting, a metallic taste in the mouth, nausea/vomiting and severe allergy.
Opioid Pregnancy And Lactation Text: These medications can lead to premature delivery and should be avoided during pregnancy. These medications are also present in breast milk in small amounts.
Olumiant Pregnancy And Lactation Text: Based on animal studies, Olumiant may cause embryo-fetal harm when administered to pregnant women.  The medication should not be used in pregnancy.  Breastfeeding is not recommended during treatment.
Ketoconazole Counseling:   Patient counseled regarding improving absorption with orange juice.  Adverse effects include but are not limited to breast enlargement, headache, diarrhea, nausea, upset stomach, liver function test abnormalities, taste disturbance, and stomach pain.  There is a rare possibility of liver failure that can occur when taking ketoconazole. The patient understands that monitoring of LFTs may be required, especially at baseline. The patient verbalized understanding of the proper use and possible adverse effects of ketoconazole.  All of the patient's questions and concerns were addressed.
Niacinamide Pregnancy And Lactation Text: These medications are considered safe during pregnancy.
Cantharidin Counseling:  I discussed with the patient the risks of Cantharidin including but not limited to pain, redness, burning, itching, and blistering.
Dutasteride Male Counseling: Dustasteride Counseling:  I discussed with the patient the risks of use of dutasteride including but not limited to decreased libido, decreased ejaculate volume, and gynecomastia. Women who can become pregnant should not handle medication.  All of the patient's questions and concerns were addressed.
Detail Level: Detailed
Xolair Counseling:  Patient informed of potential adverse effects including but not limited to fever, muscle aches, rash and allergic reactions.  The patient verbalized understanding of the proper use and possible adverse effects of Xolair.  All of the patient's questions and concerns were addressed.
Gabapentin Counseling: I discussed with the patient the risks of gabapentin including but not limited to dizziness, somnolence, fatigue and ataxia.
Valtrex Counseling: I discussed with the patient the risks of valacyclovir including but not limited to kidney damage, nausea, vomiting and severe allergy.  The patient understands that if the infection seems to be worsening or is not improving, they are to call.
Opzelura Counseling:  I discussed with the patient the risks of Opzelura including but not limited to nasopharngitis, bronchitis, ear infection, eosinophila, hives, diarrhea, folliculitis, tonsillitis, and rhinorrhea.  Taken orally, this medication has been linked to serious infections; higher rate of mortality; malignancy and lymphoproliferative disorders; major adverse cardiovascular events; thrombosis; thrombocytopenia, anemia, and neutropenia; and lipid elevations.
Aklief counseling:  Patient advised to apply a pea-sized amount only at bedtime and wait 30 minutes after washing their face before applying.  If too drying, patient may add a non-comedogenic moisturizer.  The most commonly reported side effects including irritation, redness, scaling, dryness, stinging, burning, itching, and increased risk of sunburn.  The patient verbalized understanding of the proper use and possible adverse effects of retinoids.  All of the patient's questions and concerns were addressed.
Wartpeel Counseling:  I discussed with the patient the risks of Wartpeel including but not limited to erythema, scaling, itching, weeping, crusting, and pain.
Bexarotene Counseling:  I discussed with the patient the risks of bexarotene including but not limited to hair loss, dry lips/skin/eyes, liver abnormalities, hyperlipidemia, pancreatitis, depression/suicidal ideation, photosensitivity, drug rash/allergic reactions, hypothyroidism, anemia, leukopenia, infection, cataracts, and teratogenicity.  Patient understands that they will need regular blood tests to check lipid profile, liver function tests, white blood cell count, thyroid function tests and pregnancy test if applicable.
Topical Clindamycin Pregnancy And Lactation Text: This medication is Pregnancy Category B and is considered safe during pregnancy. It is unknown if it is excreted in breast milk.
Cantharidin Pregnancy And Lactation Text: This medication has not been proven safe during pregnancy. It is unknown if this medication is excreted in breast milk.
Metronidazole Pregnancy And Lactation Text: This medication is Pregnancy Category B and considered safe during pregnancy.  It is also excreted in breast milk.
Nsaids Counseling: NSAID Counseling: I discussed with the patient that NSAIDs should be taken with food. Prolonged use of NSAIDs can result in the development of stomach ulcers.  Patient advised to stop taking NSAIDs if abdominal pain occurs.  The patient verbalized understanding of the proper use and possible adverse effects of NSAIDs.  All of the patient's questions and concerns were addressed.
Ketoconazole Pregnancy And Lactation Text: This medication is Pregnancy Category C and it isn't know if it is safe during pregnancy. It is also excreted in breast milk and breast feeding isn't recommended.
Methotrexate Pregnancy And Lactation Text: This medication is Pregnancy Category X and is known to cause fetal harm. This medication is excreted in breast milk.
Tetracycline Counseling: Patient counseled regarding possible photosensitivity and increased risk for sunburn.  Patient instructed to avoid sunlight, if possible.  When exposed to sunlight, patients should wear protective clothing, sunglasses, and sunscreen.  The patient was instructed to call the office immediately if the following severe adverse effects occur:  hearing changes, easy bruising/bleeding, severe headache, or vision changes.  The patient verbalized understanding of the proper use and possible adverse effects of tetracycline.  All of the patient's questions and concerns were addressed. Patient understands to avoid pregnancy while on therapy due to potential birth defects.
Cimzia Counseling:  I discussed with the patient the risks of Cimzia including but not limited to immunosuppression, allergic reactions and infections.  The patient understands that monitoring is required including a PPD at baseline and must alert us or the primary physician if symptoms of infection or other concerning signs are noted.
Aklief Pregnancy And Lactation Text: It is unknown if this medication is safe to use during pregnancy.  It is unknown if this medication is excreted in breast milk.  Breastfeeding women should use the topical cream on the smallest area of the skin for the shortest time needed while breastfeeding.  Do not apply to nipple and areola.
Azathioprine Counseling:  I discussed with the patient the risks of azathioprine including but not limited to myelosuppression, immunosuppression, hepatotoxicity, lymphoma, and infections.  The patient understands that monitoring is required including baseline LFTs, Creatinine, possible TPMP genotyping and weekly CBCs for the first month and then every 2 weeks thereafter.  The patient verbalized understanding of the proper use and possible adverse effects of azathioprine.  All of the patient's questions and concerns were addressed.
Bexarotene Pregnancy And Lactation Text: This medication is Pregnancy Category X and should not be given to women who are pregnant or may become pregnant. This medication should not be used if you are breast feeding.
Rinvoq Counseling: I discussed with the patient the risks of Rinvoq therapy including but not limited to upper respiratory tract infections, shingles, cold sores, bronchitis, nausea, cough, fever, acne, and headache. Live vaccines should be avoided.  This medication has been linked to serious infections; higher rate of mortality; malignancy and lymphoproliferative disorders; major adverse cardiovascular events; thrombosis; thrombocytopenia, anemia, and neutropenia; lipid elevations; liver enzyme elevations; and gastrointestinal perforations.
Dutasteride Female Counseling: Dutasteride Counseling:  I discussed with the patient the risks of use of dutasteride including but not limited to decreased libido and sexual dysfunction. Explained the teratogenic nature of the medication and stressed the importance of not getting pregnant during treatment. All of the patient's questions and concerns were addressed.
Solaraze Counseling:  I discussed with the patient the risks of Solaraze including but not limited to erythema, scaling, itching, weeping, crusting, and pain.
5-Fu Counseling: 5-Fluorouracil Counseling:  I discussed with the patient the risks of 5-fluorouracil including but not limited to erythema, scaling, itching, weeping, crusting, and pain.
Propranolol Counseling:  I discussed with the patient the risks of propranolol including but not limited to low heart rate, low blood pressure, low blood sugar, restlessness and increased cold sensitivity. They should call the office if they experience any of these side effects.
Topical Ketoconazole Counseling: Patient counseled that this medication may cause skin irritation or allergic reactions.  In the event of skin irritation, the patient was advised to reduce the amount of the drug applied or use it less frequently.   The patient verbalized understanding of the proper use and possible adverse effects of ketoconazole.  All of the patient's questions and concerns were addressed.
Arava Counseling:  Patient counseled regarding adverse effects of Arava including but not limited to nausea, vomiting, abnormalities in liver function tests. Patients may develop mouth sores, rash, diarrhea, and abnormalities in blood counts. The patient understands that monitoring is required including LFTs and blood counts.  There is a rare possibility of scarring of the liver and lung problems that can occur when taking methotrexate. Persistent nausea, loss of appetite, pale stools, dark urine, cough, and shortness of breath should be reported immediately. Patient advised to discontinue Arava treatment and consult with a physician prior to attempting conception. The patient will have to undergo a treatment to eliminate Arava from the body prior to conception.
Opzelura Pregnancy And Lactation Text: There is insufficient data to evaluate drug-associated risk for major birth defects, miscarriage, or other adverse maternal or fetal outcomes.  There is a pregnancy registry that monitors pregnancy outcomes in pregnant persons exposed to the medication during pregnancy.  It is unknown if this medication is excreted in breast milk.  Do not breastfeed during treatment and for about 4 weeks after the last dose.
Cephalexin Counseling: I counseled the patient regarding use of cephalexin as an antibiotic for prophylactic and/or therapeutic purposes. Cephalexin (commonly prescribed under brand name Keflex) is a cephalosporin antibiotic which is active against numerous classes of bacteria, including most skin bacteria. Side effects may include nausea, diarrhea, gastrointestinal upset, rash, hives, yeast infections, and in rare cases, hepatitis, kidney disease, seizures, fever, confusion, neurologic symptoms, and others. Patients with severe allergies to penicillin medications are cautioned that there is about a 10% incidence of cross-reactivity with cephalosporins. When possible, patients with penicillin allergies should use alternatives to cephalosporins for antibiotic therapy.
Xolair Pregnancy And Lactation Text: This medication is Pregnancy Category B and is considered safe during pregnancy. This medication is excreted in breast milk.
Valtrex Pregnancy And Lactation Text: this medication is Pregnancy Category B and is considered safe during pregnancy. This medication is not directly found in breast milk but it's metabolite acyclovir is present.
Include Pregnancy/Lactation Warning?: No
Hyrimoz Counseling:  I discussed with the patient the risks of adalimumab including but not limited to myelosuppression, immunosuppression, autoimmune hepatitis, demyelinating diseases, lymphoma, and serious infections.  The patient understands that monitoring is required including a PPD at baseline and must alert us or the primary physician if symptoms of infection or other concerning signs are noted.
Terbinafine Counseling: Patient counseling regarding adverse effects of terbinafine including but not limited to headache, diarrhea, rash, upset stomach, liver function test abnormalities, itching, taste/smell disturbance, nausea, abdominal pain, and flatulence.  There is a rare possibility of liver failure that can occur when taking terbinafine.  The patient understands that a baseline LFT and kidney function test may be required. The patient verbalized understanding of the proper use and possible adverse effects of terbinafine.  All of the patient's questions and concerns were addressed.
Prednisone Counseling:  I discussed with the patient the risks of prolonged use of prednisone including but not limited to weight gain, insomnia, osteoporosis, mood changes, diabetes, susceptibility to infection, glaucoma and high blood pressure.  In cases where prednisone use is prolonged, patients should be monitored with blood pressure checks, serum glucose levels and an eye exam.  Additionally, the patient may need to be placed on GI prophylaxis, PCP prophylaxis, and calcium and vitamin D supplementation and/or a bisphosphonate.  The patient verbalized understanding of the proper use and the possible adverse effects of prednisone.  All of the patient's questions and concerns were addressed.
Cimzia Pregnancy And Lactation Text: This medication crosses the placenta but can be considered safe in certain situations. Cimzia may be excreted in breast milk.
Albendazole Counseling:  I discussed with the patient the risks of albendazole including but not limited to cytopenia, kidney damage, nausea/vomiting and severe allergy.  The patient understands that this medication is being used in an off-label manner.
Imiquimod Counseling:  I discussed with the patient the risks of imiquimod including but not limited to erythema, scaling, itching, weeping, crusting, and pain.  Patient understands that the inflammatory response to imiquimod is variable from person to person and was educated regarded proper titration schedule.  If flu-like symptoms develop, patient knows to discontinue the medication and contact us.
Skyrizi Counseling: I discussed with the patient the risks of risankizumab-rzaa including but not limited to immunosuppression, and serious infections.  The patient understands that monitoring is required including a PPD at baseline and must alert us or the primary physician if symptoms of infection or other concerning signs are noted.
Dutasteride Pregnancy And Lactation Text: This medication is absolutely contraindicated in women, especially during pregnancy and breast feeding. Feminization of male fetuses is possible if taking while pregnant.
Isotretinoin Counseling: Patient should get monthly blood tests, not donate blood, not drive at night if vision affected, not share medication, and not undergo elective surgery for 6 months after tx completed. Side effects reviewed, pt to contact office should one occur.
Azathioprine Pregnancy And Lactation Text: This medication is Pregnancy Category D and isn't considered safe during pregnancy. It is unknown if this medication is excreted in breast milk.
Rinvoq Pregnancy And Lactation Text: Based on animal studies, Rinvoq may cause embryo-fetal harm when administered to pregnant women.  The medication should not be used in pregnancy.  Breastfeeding is not recommended during treatment and for 6 days after the last dose.
Minocycline Counseling: Patient advised regarding possible photosensitivity and discoloration of the teeth, skin, lips, tongue and gums.  Patient instructed to avoid sunlight, if possible.  When exposed to sunlight, patients should wear protective clothing, sunglasses, and sunscreen.  The patient was instructed to call the office immediately if the following severe adverse effects occur:  hearing changes, easy bruising/bleeding, severe headache, or vision changes.  The patient verbalized understanding of the proper use and possible adverse effects of minocycline.  All of the patient's questions and concerns were addressed.
Nsaids Pregnancy And Lactation Text: These medications are considered safe up to 30 weeks gestation. It is excreted in breast milk.
Solaraze Pregnancy And Lactation Text: This medication is Pregnancy Category B and is considered safe. There is some data to suggest avoiding during the third trimester. It is unknown if this medication is excreted in breast milk.
Glycopyrrolate Counseling:  I discussed with the patient the risks of glycopyrrolate including but not limited to skin rash, drowsiness, dry mouth, difficulty urinating, and blurred vision.
Picato Counseling:  I discussed with the patient the risks of Picato including but not limited to erythema, scaling, itching, weeping, crusting, and pain.
Cephalexin Pregnancy And Lactation Text: This medication is Pregnancy Category B and considered safe during pregnancy.  It is also excreted in breast milk but can be used safely for shorter doses.
Winlevi Counseling:  I discussed with the patient the risks of topical clascoterone including but not limited to erythema, scaling, itching, and stinging. Patient voiced their understanding.
Azelaic Acid Counseling: Patient counseled that medicine may cause skin irritation and to avoid applying near the eyes.  In the event of skin irritation, the patient was advised to reduce the amount of the drug applied or use it less frequently.   The patient verbalized understanding of the proper use and possible adverse effects of azelaic acid.  All of the patient's questions and concerns were addressed.
Cosentyx Counseling:  I discussed with the patient the risks of Cosentyx including but not limited to worsening of Crohn's disease, immunosuppression, allergic reactions and infections.  The patient understands that monitoring is required including a PPD at baseline and must alert us or the primary physician if symptoms of infection or other concerning signs are noted.
Erivedge Counseling- I discussed with the patient the risks of Erivedge including but not limited to nausea, vomiting, diarrhea, constipation, weight loss, changes in the sense of taste, decreased appetite, muscle spasms, and hair loss.  The patient verbalized understanding of the proper use and possible adverse effects of Erivedge.  All of the patient's questions and concerns were addressed.
Propranolol Pregnancy And Lactation Text: This medication is Pregnancy Category C and it isn't known if it is safe during pregnancy. It is excreted in breast milk.
Drysol Counseling:  I discussed with the patient the risks of drysol/aluminum chloride including but not limited to skin rash, itching, irritation, burning.
Sotyktu Counseling:  I discussed the most common side effects of Sotyktu including: common cold, sore throat, sinus infections, cold sores, canker sores, folliculitis, and acne.  I also discussed more serious side effects of Sotyktu including but not limited to: serious allergic reactions; increased risk for infections such as TB; cancers such as lymphomas; rhabdomyolysis and elevated CPK; and elevated triglycerides and liver enzymes. 
Soolantra Counseling: I discussed with the patients the risks of topial Soolantra. This is a medicine which decreases the number of mites and inflammation in the skin. You experience burning, stinging, eye irritation or allergic reactions.  Please call our office if you develop any problems from using this medication.
Olanzapine Counseling- I discussed with the patient the common side effects of olanzapine including but are not limited to: lack of energy, dry mouth, increased appetite, sleepiness, tremor, constipation, dizziness, changes in behavior, or restlessness.  Explained that teenagers are more likely to experience headaches, abdominal pain, pain in the arms or legs, tiredness, and sleepiness.  Serious side effects include but are not limited: increased risk of death in elderly patients who are confused, have memory loss, or dementia-related psychosis; hyperglycemia; increased cholesterol and triglycerides; and weight gain.
Winlevi Pregnancy And Lactation Text: This medication is considered safe during pregnancy and breastfeeding.
Clindamycin Counseling: I counseled the patient regarding use of clindamycin as an antibiotic for prophylactic and/or therapeutic purposes. Clindamycin is active against numerous classes of bacteria, including skin bacteria. Side effects may include nausea, diarrhea, gastrointestinal upset, rash, hives, yeast infections, and in rare cases, colitis.
Fluconazole Counseling:  Patient counseled regarding adverse effects of fluconazole including but not limited to headache, diarrhea, nausea, upset stomach, liver function test abnormalities, taste disturbance, and stomach pain.  There is a rare possibility of liver failure that can occur when taking fluconazole.  The patient understands that monitoring of LFTs and kidney function test may be required, especially at baseline. The patient verbalized understanding of the proper use and possible adverse effects of fluconazole.  All of the patient's questions and concerns were addressed.
Glycopyrrolate Pregnancy And Lactation Text: This medication is Pregnancy Category B and is considered safe during pregnancy. It is unknown if it is excreted breast milk.
Finasteride Male Counseling: Finasteride Counseling:  I discussed with the patient the risks of use of finasteride including but not limited to decreased libido, decreased ejaculate volume, gynecomastia, and depression. Women should not handle medication.  All of the patient's questions and concerns were addressed.
Isotretinoin Pregnancy And Lactation Text: This medication is Pregnancy Category X and is considered extremely dangerous during pregnancy. It is unknown if it is excreted in breast milk.
Cellcept Counseling:  I discussed with the patient the risks of mycophenolate mofetil including but not limited to infection/immunosuppression, GI upset, hypokalemia, hypercholesterolemia, bone marrow suppression, lymphoproliferative disorders, malignancy, GI ulceration/bleed/perforation, colitis, interstitial lung disease, kidney failure, progressive multifocal leukoencephalopathy, and birth defects.  The patient understands that monitoring is required including a baseline creatinine and regular CBC testing. In addition, patient must alert us immediately if symptoms of infection or other concerning signs are noted.
Ivermectin Counseling:  Patient instructed to take medication on an empty stomach with a full glass of water.  Patient informed of potential adverse effects including but not limited to nausea, diarrhea, dizziness, itching, and swelling of the extremities or lymph nodes.  The patient verbalized understanding of the proper use and possible adverse effects of ivermectin.  All of the patient's questions and concerns were addressed.
Ilumya Counseling: I discussed with the patient the risks of tildrakizumab including but not limited to immunosuppression, malignancy, posterior leukoencephalopathy syndrome, and serious infections.  The patient understands that monitoring is required including a PPD at baseline and must alert us or the primary physician if symptoms of infection or other concerning signs are noted.
Azelaic Acid Pregnancy And Lactation Text: This medication is considered safe during pregnancy and breast feeding.
Olanzapine Pregnancy And Lactation Text: This medication is pregnancy category C.   There are no adequate and well controlled trials with olanzapine in pregnant females.  Olanzapine should be used during pregnancy only if the potential benefit justifies the potential risk to the fetus.   In a study in lactating healthy women, olanzapine was excreted in breast milk.  It is recommended that women taking olanzapine should not breast feed.
Soolantra Pregnancy And Lactation Text: This medication is Pregnancy Category C. This medication is considered safe during breast feeding.
Stelara Counseling:  I discussed with the patient the risks of ustekinumab including but not limited to immunosuppression, malignancy, posterior leukoencephalopathy syndrome, and serious infections.  The patient understands that monitoring is required including a PPD at baseline and must alert us or the primary physician if symptoms of infection or other concerning signs are noted.
Clofazimine Counseling:  I discussed with the patient the risks of clofazimine including but not limited to skin and eye pigmentation, liver damage, nausea/vomiting, gastrointestinal bleeding and allergy.
Topical Metronidazole Counseling: Metronidazole is a topical antibiotic medication. You may experience burning, stinging, redness, or allergic reactions.  Please call our office if you develop any problems from using this medication.
SSKI Counseling:  I discussed with the patient the risks of SSKI including but not limited to thyroid abnormalities, metallic taste, GI upset, fever, headache, acne, arthralgias, paraesthesias, lymphadenopathy, easy bleeding, arrhythmias, and allergic reaction.
Klisyri Counseling:  I discussed with the patient the risks of Klisyri including but not limited to erythema, scaling, itching, weeping, crusting, and pain.
Quinolones Counseling:  I discussed with the patient the risks of fluoroquinolones including but not limited to GI upset, allergic reaction, drug rash, diarrhea, dizziness, photosensitivity, yeast infections, liver function test abnormalities, tendonitis/tendon rupture.
High Dose Vitamin A Counseling: Side effects reviewed, pt to contact office should one occur.
Sotyktu Pregnancy And Lactation Text: There is insufficient data to evaluate whether or not Sotyktu is safe to use during pregnancy.   It is not known if Sotyktu passes into breast milk and whether or not it is safe to use when breastfeeding.  
Finasteride Female Counseling: Finasteride Counseling:  I discussed with the patient the risks of use of finasteride including but not limited to decreased libido and sexual dysfunction. Explained the teratogenic nature of the medication and stressed the importance of not getting pregnant during treatment. All of the patient's questions and concerns were addressed.

## 2024-05-02 NOTE — PROCEDURE: MOHS SURGERY
Provider Procedure Text (D): After obtaining clear surgical margins the defect was repaired by another provider.
Is There Clinical Or Radiological Involvement Of A Named Nerve (Microscopic Perineural Invasion Handled In Stages Tab)?: No
Ear Star Wedge Flap Text: The defect edges were debeveled with a #15 blade scalpel.  Given the location of the defect and the proximity to free margins (helical rim) an ear star wedge flap was deemed most appropriate.  Using a sterile surgical marker, the appropriate flap was drawn incorporating the defect and placing the expected incisions between the helical rim and antihelix where possible.  The area thus outlined was incised through and through with a #15 scalpel blade.
Mid-Level Procedure Text (E): After obtaining clear surgical margins the patient was sent to a mid-level provider for surgical repair.  The patient understands they will receive post-surgical care and follow-up from the mid-level provider.
Stage 8: Additional Anesthesia Volume In Cc: 0
Abbe Flap (Lower To Upper Lip) Text: The defect of the upper lip was assessed and measured.  Given the location and size of the defect, an Abbe flap was deemed most appropriate.  Using a sterile surgical marker, an appropriate Abbe flap was measured and drawn on the lower lip. Local anesthesia was then infiltrated. A scalpel was then used to incise the upper lip through and through the skin, vermilion, muscle and mucosa, leaving the flap pedicled on the opposite side.  The flap was then rotated and transferred to the lower lip defect.  The flap was then sutured into place with a three layer technique, closing the orbicularis oris muscle layer with subcutaneous buried sutures, followed by a mucosal layer and an epidermal layer.
Stage 1: Number Of Blocks?: 1
Referring Physician (Optional): DOMONIQUE Boss MD
Suture Removal: 21 days
Double Island Pedicle Flap Text: The defect edges were debeveled with a #15 scalpel blade.  Given the location of the defect, shape of the defect and the proximity to free margins a double island pedicle advancement flap was deemed most appropriate.  Using a sterile surgical marker, an appropriate advancement flap was drawn incorporating the defect, outlining the appropriate donor tissue and placing the expected incisions within the relaxed skin tension lines where possible.    The area thus outlined was incised deep to adipose tissue with a #15 scalpel blade.  The skin margins were undermined to an appropriate distance in all directions around the primary defect and laterally outward around the island pedicle utilizing iris scissors.  There was minimal undermining beneath the pedicle flap.
Zygomaticofacial Flap Text: Given the location of the defect, shape of the defect and the proximity to free margins a zygomaticofacial flap was deemed most appropriate for repair.  Using a sterile surgical marker, the appropriate flap was drawn incorporating the defect and placing the expected incisions within the relaxed skin tension lines where possible. The area thus outlined was incised deep to adipose tissue with a #15 scalpel blade with preservation of a vascular pedicle.  The skin margins were undermined to an appropriate distance in all directions utilizing iris scissors.  The flap was then placed into the defect and anchored with interrupted buried subcutaneous sutures.
M-Plasty Complex Repair Preamble Text (Leave Blank If You Do Not Want): Extensive wide undermining was performed.
Tissue Cultured Epidermal Autograft Text: The defect edges were debeveled with a #15 scalpel blade.  Given the location of the defect, shape of the defect and the proximity to free margins a tissue cultured epidermal autograft was deemed most appropriate.  The graft was then trimmed to fit the size of the defect.  The graft was then placed in the primary defect and oriented appropriately.
Partial Purse String (Simple) Text: Given the location of the defect and the characteristics of the surrounding skin a simple purse string closure was deemed most appropriate.  Undermining was performed circumfirentially around the surgical defect.  A purse string suture was then placed and tightened. Wound tension only allowed a partial closure of the circular defect.
Dorsal Nasal Flap Text: The defect edges were debeveled with a #15 scalpel blade.  Given the location of the defect and the proximity to free margins a dorsal nasal flap was deemed most appropriate.  Using a sterile surgical marker, an appropriate dorsal nasal flap was drawn around the defect.    The area thus outlined was incised deep to adipose tissue with a #15 scalpel blade.  The skin margins were undermined to an appropriate distance in all directions utilizing iris scissors.
Composite Graft Text: The defect edges were debeveled with a #15 scalpel blade.  Given the location of the defect, shape of the defect, the proximity to free margins and the fact the defect was full thickness a composite graft was deemed most appropriate.  The defect was outline and then transferred to the donor site.  A full thickness graft was then excised from the donor site. The graft was then placed in the primary defect, oriented appropriately and then sutured into place.  The secondary defect was then repaired using a primary closure.
Stage 14: Additional Anesthesia Type: 1% lidocaine with epinephrine
Special Stains Stage 10 - Results: Base On Clearance Noted Above
H Plasty Text: Given the location of the defect, shape of the defect and the proximity to free margins a H-plasty was deemed most appropriate for repair.  Using a sterile surgical marker, the appropriate advancement arms of the H-plasty were drawn incorporating the defect and placing the expected incisions within the relaxed skin tension lines where possible. The area thus outlined was incised deep to adipose tissue with a #15 scalpel blade. The skin margins were undermined to an appropriate distance in all directions utilizing iris scissors.  The opposing advancement arms were then advanced into place in opposite direction and anchored with interrupted buried subcutaneous sutures.
A-T Advancement Flap Text: The defect edges were debeveled with a #15 scalpel blade.  Given the location of the defect, shape of the defect and the proximity to free margins an A-T advancement flap was deemed most appropriate.  Using a sterile surgical marker, an appropriate advancement flap was drawn incorporating the defect and placing the expected incisions within the relaxed skin tension lines where possible.    The area thus outlined was incised deep to adipose tissue with a #15 scalpel blade.  The skin margins were undermined to an appropriate distance in all directions utilizing iris scissors.
Helical Rim Text: The closure involved the helical rim.
Wound Care (No Sutures): Petrolatum
Plastic Surgeon Procedure Text (B): After obtaining clear surgical margins the patient was sent to plastics for surgical repair.  The patient understands they will receive post-surgical care and follow-up from the referring physician's office.
Show Specific Providers When Referring To Others For Closure?: Yes
Spiral Flap Text: The defect edges were debeveled with a #15 scalpel blade.  Given the location of the defect, shape of the defect and the proximity to free margins a spiral flap was deemed most appropriate.  Using a sterile surgical marker, an appropriate rotation flap was drawn incorporating the defect and placing the expected incisions within the relaxed skin tension lines where possible. The area thus outlined was incised deep to adipose tissue with a #15 scalpel blade.  The skin margins were undermined to an appropriate distance in all directions utilizing iris scissors.
Pinch Graft Text: The defect edges were debeveled with a #15 scalpel blade. Given the location of the defect, shape of the defect and the proximity to free margins a pinch graft was deemed most appropriate. Using a sterile surgical marker, the primary defect shape was transferred to the donor site. The area thus outlined was incised deep to adipose tissue with a #15 scalpel blade.  The harvested graft was then trimmed of adipose tissue until only dermis and epidermis was left. The skin margins of the secondary defect were undermined to an appropriate distance in all directions utilizing iris scissors.  The secondary defect was closed with interrupted buried subcutaneous sutures.  The skin edges were then re-apposed with running  sutures.  The skin graft was then placed in the primary defect and oriented appropriately.
Dressing: pressure dressing with telfa
Is There Documentation In The Chart Showing Discussion Of Changes With Another Physician?: Please Select the Appropriate Response
W Plasty Text: The lesion was extirpated to the level of the fat with a #15 scalpel blade.  Given the location of the defect, shape of the defect and the proximity to free margins a W-plasty was deemed most appropriate for repair.  Using a sterile surgical marker, the appropriate transposition arms of the W-plasty were drawn incorporating the defect and placing the expected incisions within the relaxed skin tension lines where possible.    The area thus outlined was incised deep to adipose tissue with a #15 scalpel blade.  The skin margins were undermined to an appropriate distance in all directions utilizing iris scissors.  The opposing transposition arms were then transposed into place in opposite direction and anchored with interrupted buried subcutaneous sutures.
Initial Size Of Lesion: 0.3
Purse String (Simple) Text: Given the location of the defect and the characteristics of the surrounding skin a purse string closure was deemed most appropriate.  Undermining was performed circumfirentially around the surgical defect.  A purse string suture was then placed and tightened.
Vermilion Border Text: The closure involved the vermilion border.
Referred To Asc For Closure Text (Leave Blank If You Do Not Want): After obtaining clear surgical margins the patient was sent to an ASC for surgical repair.  The patient understands they will receive post-surgical care and follow-up from the ASC physician.
Mohs Case Number: [id number]
Surgical Defect Width In Cm (Optional): 1.0
Cheek-To-Nose Interpolation Flap Text: A decision was made to reconstruct the defect utilizing an interpolation axial flap and a staged reconstruction.  A telfa template was made of the defect.  This telfa template was then used to outline the Cheek-To-Nose Interpolation flap.  The donor area for the pedicle flap was then injected with anesthesia.  The flap was excised through the skin and subcutaneous tissue down to the layer of the underlying musculature.  The interpolation flap was carefully excised within this deep plane to maintain its blood supply.  The edges of the donor site were undermined.   The donor site was closed in a primary fashion.  The pedicle was then rotated into position and sutured.  Once the tube was sutured into place, adequate blood supply was confirmed with blanching and refill.  The pedicle was then wrapped with xeroform gauze and dressed appropriately with a telfa and gauze bandage to ensure continued blood supply and protect the attached pedicle.
Hemostasis: Electrocautery
Melolabial Interpolation Flap Text: A decision was made to reconstruct the defect utilizing an interpolation axial flap and a staged reconstruction.  A telfa template was made of the defect.  This telfa template was then used to outline the melolabial interpolation flap.  The donor area for the pedicle flap was then injected with anesthesia.  The flap was excised through the skin and subcutaneous tissue down to the layer of the underlying musculature.  The pedicle flap was carefully excised within this deep plane to maintain its blood supply.  The edges of the donor site were undermined.   The donor site was closed in a primary fashion.  The pedicle was then rotated into position and sutured.  Once the tube was sutured into place, adequate blood supply was confirmed with blanching and refill.  The pedicle was then wrapped with xeroform gauze and dressed appropriately with a telfa and gauze bandage to ensure continued blood supply and protect the attached pedicle.
Closure 2 Information: This tab is for additional flaps and grafts, including complex repair and grafts and complex repair and flaps. You can also specify a different location for the additional defect, if the location is the same you do not need to select a new one. We will insert the automated text for the repair you select below just as we do for solitary flaps and grafts. Please note that at this time if you select a location with a different insurance zone you will need to override the ICD10 and CPT if appropriate.
Surgical Defect Length In Cm (Optional): 1.2
Home Suture Removal Text: Patient was provided instructions on removing sutures and will remove their sutures at home.  If they have any questions or difficulties they will call the office.
Staging Info: By selecting yes to the question above you will include information on AJCC 8 tumor staging in your Mohs note. Information on tumor staging will be automatically added for SCCs on the head and neck. AJCC 8 includes tumor size, tumor depth, perineural involvement and bone invasion.
Non-Graft Cartilage Fenestration Text: The cartilage was fenestrated with a 2mm punch biopsy to help facilitate healing.
Muscle Hinge Flap Text: The defect edges were debeveled with a #15 scalpel blade.  Given the size, depth and location of the defect and the proximity to free margins a muscle hinge flap was deemed most appropriate.  Using a sterile surgical marker, an appropriate hinge flap was drawn incorporating the defect. The area thus outlined was incised with a #15 scalpel blade.  The skin margins were undermined to an appropriate distance in all directions utilizing iris scissors.
Purse String (Intermediate) Text: Given the location of the defect and the characteristics of the surrounding skin a purse string intermediate closure was deemed most appropriate.  Undermining was performed circumfirentially around the surgical defect.  A purse string suture was then placed and tightened.
Area H Indication Text: Tumors in this location are included in Area H (eyelids, eyebrows, nose, lips, chin, ear, pre-auricular, post-auricular, temple, genitalia, hands, feet, ankles and areola).  Tissue conservation is critical in these anatomic locations.
M-Plasty Intermediate Repair Preamble Text (Leave Blank If You Do Not Want): Undermining was performed with blunt dissection.
Post-Care Instructions: I reviewed with the patient in detail post-care instructions. Patient is not to engage in any heavy lifting, exercise, or swimming for the next 14 days. Should the patient develop any fevers, chills, bleeding, severe pain patient will contact the office immediately.
Cheiloplasty (Less Than 50%) Text: A decision was made to reconstruct the defect with a  cheiloplasty.  The defect was undermined extensively.  Additional obicularis oris muscle was excised with a 15 blade scalpel.  The defect was converted into a full thickness wedge, of less than 50% of the vertical height of the lip, to facilite a better cosmetic result.  Small vessels were then tied off with 5-0 monocyrl. The obicularis oris, superficial fascia, adipose and dermis were then reapproximated.  After the deeper layers were approximated the epidermis was reapproximated with particular care given to realign the vermilion border.
Intermediate Repair And Flap Additional Text (Will Appearing After The Standard Complex Repair Text): The intermediate repair was not sufficient to completely close the primary defect. The remaining additional defect was repaired with the flap mentioned below.
Inflammation Suggestive Of Cancer Camouflage Histology Text: There was a dense lymphocytic infiltrate which prevented adequate histologic evaluation of adjacent structures.
O-Z Plasty Text: The defect edges were debeveled with a #15 scalpel blade.  Given the location of the defect, shape of the defect and the proximity to free margins an O-Z plasty (double transposition flap) was deemed most appropriate.  Using a sterile surgical marker, the appropriate transposition flaps were drawn incorporating the defect and placing the expected incisions within the relaxed skin tension lines where possible.    The area thus outlined was incised deep to adipose tissue with a #15 scalpel blade.  The skin margins were undermined to an appropriate distance in all directions utilizing iris scissors.  Hemostasis was achieved with electrocautery.  The flaps were then transposed into place, one clockwise and the other counterclockwise, and anchored with interrupted buried subcutaneous sutures.
Simple / Intermediate / Complex Repair - Final Wound Length In Cm: 3
Hemigard Retention Suture: 0-0 Nylon
Oculoplastic Surgeon Procedure Text (A): After obtaining clear surgical margins the patient was sent to oculoplastics for surgical repair.  The patient understands they will receive post-surgical care and follow-up from the referring physician's office.
Closure 4 Information: This tab is for additional flaps and grafts above and beyond our usual structured repairs.  Please note if you enter information here it will not currently bill and you will need to add the billing information manually.
Bilateral Rotation Flap Text: The defect edges were debeveled with a #15 scalpel blade. Given the location of the defect, shape of the defect and the proximity to free margins a bilateral rotation flap was deemed most appropriate. Using a sterile surgical marker, an appropriate rotation flap was drawn incorporating the defect and placing the expected incisions within the relaxed skin tension lines where possible. The area thus outlined was incised deep to adipose tissue with a #15 scalpel blade. The skin margins were undermined to an appropriate distance in all directions utilizing iris scissors. Following this, the designed flap was carried over into the primary defect and sutured into place.
Area L Indication Text: Tumors in this location are included in Area L (trunk and extremities).  Mohs surgery is indicated for larger tumors, or tumors with aggressive histologic features, in these anatomic locations.
X Size Of Lesion In Cm (Optional): 0.4
Unna Boot Text: An Unna boot was placed to help immobilize the limb and facilitate more rapid healing.
Alternatives Discussed Intro (Do Not Add Period): I discussed alternative treatments to Mohs surgery and specifically discussed the risks and benefits of
Double O-Z Flap Text: The defect edges were debeveled with a #15 scalpel blade.  Given the location of the defect, shape of the defect and the proximity to free margins a Double O-Z flap was deemed most appropriate.  Using a sterile surgical marker, an appropriate transposition flap was drawn incorporating the defect and placing the expected incisions within the relaxed skin tension lines where possible. The area thus outlined was incised deep to adipose tissue with a #15 scalpel blade.  The skin margins were undermined to an appropriate distance in all directions utilizing iris scissors.
Hemigard Postcare Instructions: The HEMIGARD strips are to remain completely dry for at least 5-7 days.
Consent 1/Introductory Paragraph: The rationale for Mohs was explained to the patient and consent was obtained. The risks, benefits and alternatives to therapy were discussed in detail. Specifically, the risks of infection, scarring, bleeding, prolonged wound healing, incomplete removal, allergy to anesthesia, nerve injury and recurrence were addressed. Prior to the procedure, the treatment site was clearly identified and confirmed by the patient. All components of Universal Protocol/PAUSE Rule completed.
Same Histology In Subsequent Stages Text: The pattern and morphology of the tumor is as described in the first stage.
Mustarde Flap Text: The defect edges were debeveled with a #15 scalpel blade.  Given the size, depth and location of the defect and the proximity to free margins a Mustarde flap was deemed most appropriate.  Using a sterile surgical marker, an appropriate flap was drawn incorporating the defect. The area thus outlined was incised with a #15 scalpel blade.  The skin margins were undermined to an appropriate distance in all directions utilizing iris scissors.
Consent (Nose)/Introductory Paragraph: The rationale for Mohs was explained to the patient and consent was obtained. The risks, benefits and alternatives to therapy were discussed in detail. Specifically, the risks of nasal deformity, changes in the flow of air through the nose, infection, scarring, bleeding, prolonged wound healing, incomplete removal, allergy to anesthesia, nerve injury and recurrence were addressed. Prior to the procedure, the treatment site was clearly identified and confirmed by the patient. All components of Universal Protocol/PAUSE Rule completed.
Secondary Intention Text (Leave Blank If You Do Not Want): The defect will heal with secondary intention.
Cheek Interpolation Flap Text: A decision was made to reconstruct the defect utilizing an interpolation axial flap and a staged reconstruction.  A telfa template was made of the defect.  This telfa template was then used to outline the Cheek Interpolation flap.  The donor area for the pedicle flap was then injected with anesthesia.  The flap was excised through the skin and subcutaneous tissue down to the layer of the underlying musculature.  The interpolation flap was carefully excised within this deep plane to maintain its blood supply.  The edges of the donor site were undermined.   The donor site was closed in a primary fashion.  The pedicle was then rotated into position and sutured.  Once the tube was sutured into place, adequate blood supply was confirmed with blanching and refill.  The pedicle was then wrapped with xeroform gauze and dressed appropriately with a telfa and gauze bandage to ensure continued blood supply and protect the attached pedicle.
Consent (Spinal Accessory)/Introductory Paragraph: The rationale for Mohs was explained to the patient and consent was obtained. The risks, benefits and alternatives to therapy were discussed in detail. Specifically, the risks of damage to the spinal accessory nerve, infection, scarring, bleeding, prolonged wound healing, incomplete removal, allergy to anesthesia, and recurrence were addressed. Prior to the procedure, the treatment site was clearly identified and confirmed by the patient. All components of Universal Protocol/PAUSE Rule completed.
Graft Cartilage Fenestration Text: The cartilage was fenestrated with a 2mm punch biopsy to help facilitate graft survival and healing.
Ear Wedge Repair Text: A wedge excision was completed by carrying down an excision through the full thickness of the ear and cartilage with an inward facing Burow's triangle. The wound was then closed in a layered fashion.
Otolaryngologist Procedure Text (D): After obtaining clear surgical margins the patient was sent to otolaryngology for surgical repair.  The patient understands they will receive post-surgical care and follow-up from the referring physician's office.
Bcc Histology Text: There were numerous aggregates of basaloid cells.
Mart-1 - Positive Histology Text: MART-1 staining demonstrates areas of higher density and clustering of melanocytes with Pagetoid spread upwards within the epidermis. The surgical margins are positive for tumor cells.
Alar Island Pedicle Flap Text: The defect edges were debeveled with a #15 scalpel blade.  Given the location of the defect, shape of the defect and the proximity to the alar rim an island pedicle advancement flap was deemed most appropriate.  Using a sterile surgical marker, an appropriate advancement flap was drawn incorporating the defect, outlining the appropriate donor tissue and placing the expected incisions within the nasal ala running parallel to the alar rim. The area thus outlined was incised with a #15 scalpel blade.  The skin margins were undermined minimally to an appropriate distance in all directions around the primary defect and laterally outward around the island pedicle utilizing iris scissors.  There was minimal undermining beneath the pedicle flap.
Donor Site Anesthesia Type: same as repair anesthesia
Detail Level: Detailed
Retention Suture Bite Size: 1 mm
Trilobed Flap Text: The defect edges were debeveled with a #15 scalpel blade.  Given the location of the defect and the proximity to free margins a trilobed flap was deemed most appropriate.  Using a sterile surgical marker, an appropriate trilobed flap drawn around the defect.    The area thus outlined was incised deep to adipose tissue with a #15 scalpel blade.  The skin margins were undermined to an appropriate distance in all directions utilizing iris scissors.
Nasalis-Muscle-Based Myocutaneous Island Pedicle Flap Text: Using a #15 blade, an incision was made around the donor flap to the level of the nasalis muscle. Wide lateral undermining was then performed in both the subcutaneous plane above the nasalis muscle, and in a submuscular plane just above periosteum. This allowed the formation of a free nasalis muscle axial pedicle (based on the angular artery) which was still attached to the actual cutaneous flap, increasing its mobility and vascular viability. Hemostasis was obtained with pinpoint electrocoagulation. The flap was mobilized into position and the pivotal anchor points positioned and stabilized with buried interrupted sutures. Subcutaneous and dermal tissues were closed in a multilayered fashion with sutures. Tissue redundancies were excised, and the epidermal edges were apposed without significant tension and sutured with sutures.
Hatchet Flap Text: The defect edges were debeveled with a #15 scalpel blade.  Given the location of the defect, shape of the defect and the proximity to free margins a hatchet flap was deemed most appropriate.  Using a sterile surgical marker, an appropriate hatchet flap was drawn incorporating the defect and placing the expected incisions within the relaxed skin tension lines where possible.    The area thus outlined was incised deep to adipose tissue with a #15 scalpel blade.  The skin margins were undermined to an appropriate distance in all directions utilizing iris scissors.
Melolabial Transposition Flap Text: The defect edges were debeveled with a #15 scalpel blade.  Given the location of the defect and the proximity to free margins a melolabial flap was deemed most appropriate.  Using a sterile surgical marker, an appropriate melolabial transposition flap was drawn incorporating the defect.    The area thus outlined was incised deep to adipose tissue with a #15 scalpel blade.  The skin margins were undermined to an appropriate distance in all directions utilizing iris scissors.
Helical Rim Advancement Flap Text: The defect edges were debeveled with a #15 blade scalpel.  Given the location of the defect and the proximity to free margins (helical rim) a double helical rim advancement flap was deemed most appropriate.  Using a sterile surgical marker, the appropriate advancement flaps were drawn incorporating the defect and placing the expected incisions between the helical rim and antihelix where possible.  The area thus outlined was incised through and through with a #15 scalpel blade.  With a skin hook and iris scissors, the flaps were gently and sharply undermined and freed up.
Repair Anesthesia Method: local infiltration
Advancement-Rotation Flap Text: The defect edges were debeveled with a #15 scalpel blade.  Given the location of the defect, shape of the defect and the proximity to free margins an advancement-rotation flap was deemed most appropriate.  Using a sterile surgical marker, an appropriate flap was drawn incorporating the defect and placing the expected incisions within the relaxed skin tension lines where possible. The area thus outlined was incised deep to adipose tissue with a #15 scalpel blade.  The skin margins were undermined to an appropriate distance in all directions utilizing iris scissors.
Mauc Instructions: By selecting yes to the question below the MAUC number will be added into the note.  This will be calculated automatically based on the diagnosis chosen, the size entered, the body zone selected (H,M,L) and the specific indications you chose. You will also have the option to override the Mohs AUC if you disagree with the automatically calculated number and this option is found in the Case Summary tab.
Xenograft Text: The defect edges were debeveled with a #15 scalpel blade.  Given the location of the defect, shape of the defect and the proximity to free margins a xenograft was deemed most appropriate.  The graft was then trimmed to fit the size of the defect.  The graft was then placed in the primary defect and oriented appropriately.
V-Y Flap Text: The defect edges were debeveled with a #15 scalpel blade.  Given the location of the defect, shape of the defect and the proximity to free margins a V-Y flap was deemed most appropriate.  Using a sterile surgical marker, an appropriate advancement flap was drawn incorporating the defect and placing the expected incisions within the relaxed skin tension lines where possible.    The area thus outlined was incised deep to adipose tissue with a #15 scalpel blade.  The skin margins were undermined to an appropriate distance in all directions utilizing iris scissors.
Postop Diagnosis: same
Advancement Flap (Double) Text: The defect edges were debeveled with a #15 scalpel blade.  Given the location of the defect and the proximity to free margins a double advancement flap was deemed most appropriate.  Using a sterile surgical marker, the appropriate advancement flaps were drawn incorporating the defect and placing the expected incisions within the relaxed skin tension lines where possible.    The area thus outlined was incised deep to adipose tissue with a #15 scalpel blade.  The skin margins were undermined to an appropriate distance in all directions utilizing iris scissors.
Staged Advancement Flap Text: The defect edges were debeveled with a #15 scalpel blade.  Given the location of the defect, shape of the defect and the proximity to free margins a staged advancement flap was deemed most appropriate.  Using a sterile surgical marker, an appropriate advancement flap was drawn incorporating the defect and placing the expected incisions within the relaxed skin tension lines where possible. The area thus outlined was incised deep to adipose tissue with a #15 scalpel blade.  The skin margins were undermined to an appropriate distance in all directions utilizing iris scissors.
Mohs Histo Method Verbiage: Each section was then chromacoded and processed in the Mohs lab using the Mohs protocol and submitted for frozen section.
Bilobed Flap Text: The defect edges were debeveled with a #15 scalpel blade.  Given the location of the defect and the proximity to free margins a bilobe flap was deemed most appropriate.  Using a sterile surgical marker, an appropriate bilobe flap drawn around the defect.    The area thus outlined was incised deep to adipose tissue with a #15 scalpel blade.  The skin margins were undermined to an appropriate distance in all directions utilizing iris scissors.
Nostril Rim Text: The closure involved the nostril rim.
O-T Plasty Text: The defect edges were debeveled with a #15 scalpel blade.  Given the location of the defect, shape of the defect and the proximity to free margins an O-T plasty was deemed most appropriate.  Using a sterile surgical marker, an appropriate O-T plasty was drawn incorporating the defect and placing the expected incisions within the relaxed skin tension lines where possible.    The area thus outlined was incised deep to adipose tissue with a #15 scalpel blade.  The skin margins were undermined to an appropriate distance in all directions utilizing iris scissors.
Double O-Z Plasty Text: The defect edges were debeveled with a #15 scalpel blade.  Given the location of the defect, shape of the defect and the proximity to free margins a Double O-Z plasty (double transposition flap) was deemed most appropriate.  Using a sterile surgical marker, the appropriate transposition flaps were drawn incorporating the defect and placing the expected incisions within the relaxed skin tension lines where possible. The area thus outlined was incised deep to adipose tissue with a #15 scalpel blade.  The skin margins were undermined to an appropriate distance in all directions utilizing iris scissors.  Hemostasis was achieved with electrocautery.  The flaps were then transposed into place, one clockwise and the other counterclockwise, and anchored with interrupted buried subcutaneous sutures.
Ftsg Text: The defect edges were debeveled with a #15 scalpel blade.  Given the location of the defect, shape of the defect and the proximity to free margins a full thickness skin graft was deemed most appropriate.  Using a sterile surgical marker, the primary defect shape was transferred to the donor site. The area thus outlined was incised deep to adipose tissue with a #15 scalpel blade.  The harvested graft was then trimmed of adipose tissue until only dermis and epidermis was left.  The skin margins of the secondary defect were undermined to an appropriate distance in all directions utilizing iris scissors.  The secondary defect was closed with interrupted buried subcutaneous sutures.  The skin edges were then re-apposed with running  sutures.  The skin graft was then placed in the primary defect and oriented appropriately.
Surgeon: Everardo Larsen MD
Orbicularis Oris Muscle Flap Text: The defect edges were debeveled with a #15 scalpel blade.  Given that the defect affected the competency of the oral sphincter an orbicularis oris muscle flap was deemed most appropriate to restore this competency and normal muscle function.  Using a sterile surgical marker, an appropriate flap was drawn incorporating the defect. The area thus outlined was incised with a #15 scalpel blade.
No Residual Tumor Seen Histology Text: There were no malignant cells seen in the sections examined.
Dermal Autograft Text: The defect edges were debeveled with a #15 scalpel blade.  Given the location of the defect, shape of the defect and the proximity to free margins a dermal autograft was deemed most appropriate.  Using a sterile surgical marker, the primary defect shape was transferred to the donor site. The area thus outlined was incised deep to adipose tissue with a #15 scalpel blade.  The harvested graft was then trimmed of adipose and epidermal tissue until only dermis was left.  The skin graft was then placed in the primary defect and oriented appropriately.
Previous Accession (Optional): [id number]
Mart-1 - Negative Histology Text: MART-1 staining demonstrates a normal density and pattern of melanocytes along the dermal-epidermal junction. The surgical margins are negative for tumor cells.
Surgeon/Pathologist Verbiage (Will Incorporate Name Of Surgeon From Intro If Not Blank): operated in two distinct and integrated capacities as the surgeon and pathologist.
Chonodrocutaneous Helical Advancement Flap Text: The defect edges were debeveled with a #15 scalpel blade.  Given the location of the defect and the proximity to free margins a chondrocutaneous helical advancement flap was deemed most appropriate.  Using a sterile surgical marker, the appropriate advancement flap was drawn incorporating the defect and placing the expected incisions within the relaxed skin tension lines where possible.    The area thus outlined was incised deep to adipose tissue with a #15 scalpel blade.  The skin margins were undermined to an appropriate distance in all directions utilizing iris scissors.
V-Y Plasty Text: The defect edges were debeveled with a #15 scalpel blade.  Given the location of the defect, shape of the defect and the proximity to free margins an V-Y advancement flap was deemed most appropriate.  Using a sterile surgical marker, an appropriate advancement flap was drawn incorporating the defect and placing the expected incisions within the relaxed skin tension lines where possible.    The area thus outlined was incised deep to adipose tissue with a #15 scalpel blade.  The skin margins were undermined to an appropriate distance in all directions utilizing iris scissors.
Anesthesia Type: 1% lidocaine with epinephrine and 0.25% bupivacaine in a 2:3 ration buffered with 8.4% sodium bicarbonate
Repair Type: Complex Repair
Complex Repair And Flap Additional Text (Will Appearing After The Standard Complex Repair Text): The complex repair was not sufficient to completely close the primary defect. The remaining additional defect was repaired with the flap mentioned below.
Estlander Flap (Upper To Lower Lip) Text: The defect of the lower lip was assessed and measured.  Given the location and size of the defect, an Estlander flap was deemed most appropriate.  Using a sterile surgical marker, an appropriate Estlander flap was measured and drawn on the upper lip. Local anesthesia was then infiltrated. A scalpel was then used to incise the lateral aspect of the flap, through skin, muscle and mucosa, leaving the flap pedicled medially.  The flap was then rotated and positioned to fill the lower lip defect.  The flap was then sutured into place with a three layer technique, closing the orbicularis oris muscle layer with subcutaneous buried sutures, followed by a mucosal layer and an epidermal layer.
Intermediate Repair And Graft Additional Text (Will Appearing After The Standard Complex Repair Text): The intermediate repair was not sufficient to completely close the primary defect. The remaining additional defect was repaired with the graft mentioned below.
Complex Repair And Graft Additional Text (Will Appearing After The Standard Complex Repair Text): The complex repair was not sufficient to completely close the primary defect. The remaining additional defect was repaired with the graft mentioned below.
Mohs Rapid Report Verbiage: The area of clinically evident tumor was marked with skin marking ink and appropriately hatched.  The initial incision was made following the Mohs approach through the skin.  The specimen was taken to the lab, divided into the necessary number of pieces, chromacoded and processed according to the Mohs protocol.  This was repeated in successive stages until a tumor free defect was achieved.
Epidermal Closure: running and interrupted
Partial Purse String (Intermediate) Text: Given the location of the defect and the characteristics of the surrounding skin an intermediate purse string closure was deemed most appropriate.  Undermining was performed circumfirentially around the surgical defect.  A purse string suture was then placed and tightened. Wound tension only allowed a partial closure of the circular defect.
Area M Indication Text: Tumors in this location are included in Area M (cheek, forehead, scalp, neck, jawline and pretibial skin).  Mohs surgery is indicated for tumors in these anatomic locations.
Star Wedge Flap Text: The defect edges were debeveled with a #15 scalpel blade.  Given the location of the defect, shape of the defect and the proximity to free margins a star wedge flap was deemed most appropriate.  Using a sterile surgical marker, an appropriate rotation flap was drawn incorporating the defect and placing the expected incisions within the relaxed skin tension lines where possible. The area thus outlined was incised deep to adipose tissue with a #15 scalpel blade.  The skin margins were undermined to an appropriate distance in all directions utilizing iris scissors.
O-T Advancement Flap Text: The defect edges were debeveled with a #15 scalpel blade.  Given the location of the defect, shape of the defect and the proximity to free margins an O-T advancement flap was deemed most appropriate.  Using a sterile surgical marker, an appropriate advancement flap was drawn incorporating the defect and placing the expected incisions within the relaxed skin tension lines where possible.    The area thus outlined was incised deep to adipose tissue with a #15 scalpel blade.  The skin margins were undermined to an appropriate distance in all directions utilizing iris scissors.
Modified Advancement Flap Text: The defect edges were debeveled with a #15 scalpel blade.  Given the location of the defect, shape of the defect and the proximity to free margins a modified advancement flap was deemed most appropriate.  Using a sterile surgical marker, an appropriate advancement flap was drawn incorporating the defect and placing the expected incisions within the relaxed skin tension lines where possible.    The area thus outlined was incised deep to adipose tissue with a #15 scalpel blade.  The skin margins were undermined to an appropriate distance in all directions utilizing iris scissors.
Skin Substitute Text: The defect edges were debeveled with a #15 scalpel blade.  Given the location of the defect, shape of the defect and the proximity to free margins a skin substitute graft was deemed most appropriate.  The graft material was trimmed to fit the size of the defect. The graft was then placed in the primary defect and oriented appropriately.
Medical Necessity Statement: Based on my medical judgement, Mohs surgery is the most appropriate treatment for this cancer compared to other treatments.
Island Pedicle Flap-Requiring Vessel Identification Text: The defect edges were debeveled with a #15 scalpel blade.  Given the location of the defect, shape of the defect and the proximity to free margins an island pedicle advancement flap was deemed most appropriate.  Using a sterile surgical marker, an appropriate advancement flap was drawn, based on the axial vessel mentioned above, incorporating the defect, outlining the appropriate donor tissue and placing the expected incisions within the relaxed skin tension lines where possible.    The area thus outlined was incised deep to adipose tissue with a #15 scalpel blade.  The skin margins were undermined to an appropriate distance in all directions around the primary defect and laterally outward around the island pedicle utilizing iris scissors.  There was minimal undermining beneath the pedicle flap.
Location Indication Override (Is Already Calculated Based On Selected Body Location): Area M
Deep Sutures: 3-0 Polysorb
Wound Care: Vaseline
Bcc Infiltrative Histology Text: There were numerous aggregates of basaloid cells demonstrating an infiltrative pattern.
Consent (Ear)/Introductory Paragraph: The rationale for Mohs was explained to the patient and consent was obtained. The risks, benefits and alternatives to therapy were discussed in detail. Specifically, the risks of ear deformity, infection, scarring, bleeding, prolonged wound healing, incomplete removal, allergy to anesthesia, nerve injury and recurrence were addressed. Prior to the procedure, the treatment site was clearly identified and confirmed by the patient. All components of Universal Protocol/PAUSE Rule completed.
Consent (Scalp)/Introductory Paragraph: The rationale for Mohs was explained to the patient and consent was obtained. The risks, benefits and alternatives to therapy were discussed in detail. Specifically, the risks of changes in hair growth pattern secondary to repair, infection, scarring, bleeding, prolonged wound healing, incomplete removal, allergy to anesthesia, nerve injury and recurrence were addressed. Prior to the procedure, the treatment site was clearly identified and confirmed by the patient. All components of Universal Protocol/PAUSE Rule completed.
Bilobed Transposition Flap Text: The defect edges were debeveled with a #15 scalpel blade.  Given the location of the defect and the proximity to free margins a bilobed transposition flap was deemed most appropriate.  Using a sterile surgical marker, an appropriate bilobe flap drawn around the defect.    The area thus outlined was incised deep to adipose tissue with a #15 scalpel blade.  The skin margins were undermined to an appropriate distance in all directions utilizing iris scissors.
Adjacent Tissue Transfer Text: The defect edges were debeveled with a #15 scalpel blade.  Given the location of the defect and the proximity to free margins an adjacent tissue transfer was deemed most appropriate.  Using a sterile surgical marker, an appropriate flap was drawn incorporating the defect and placing the expected incisions within the relaxed skin tension lines where possible.    The area thus outlined was incised deep to adipose tissue with a #15 scalpel blade.  The skin margins were undermined to an appropriate distance in all directions utilizing iris scissors.
Estimated Blood Loss (Cc): minimal
Mercedes Flap Text: The defect edges were debeveled with a #15 scalpel blade.  Given the location of the defect, shape of the defect and the proximity to free margins a Mercedes flap was deemed most appropriate.  Using a sterile surgical marker, an appropriate advancement flap was drawn incorporating the defect and placing the expected incisions within the relaxed skin tension lines where possible. The area thus outlined was incised deep to adipose tissue with a #15 scalpel blade.  The skin margins were undermined to an appropriate distance in all directions utilizing iris scissors.
Consent 3/Introductory Paragraph: I gave the patient a chance to ask questions they had about the procedure.  Following this I explained the Mohs procedure and consent was obtained. The risks, benefits and alternatives to therapy were discussed in detail. Specifically, the risks of infection, scarring, bleeding, prolonged wound healing, incomplete removal, allergy to anesthesia, nerve injury and recurrence were addressed. Prior to the procedure, the treatment site was clearly identified and confirmed by the patient. All components of Universal Protocol/PAUSE Rule completed.
Undermining Location (Optional): in the superficial subcutaneous fat
Crescentic Advancement Flap Text: The defect edges were debeveled with a #15 scalpel blade.  Given the location of the defect and the proximity to free margins a crescentic advancement flap was deemed most appropriate.  Using a sterile surgical marker, the appropriate advancement flap was drawn incorporating the defect and placing the expected incisions within the relaxed skin tension lines where possible.    The area thus outlined was incised deep to adipose tissue with a #15 scalpel blade.  The skin margins were undermined to an appropriate distance in all directions utilizing iris scissors.
Consent (Marginal Mandibular)/Introductory Paragraph: The rationale for Mohs was explained to the patient and consent was obtained. The risks, benefits and alternatives to therapy were discussed in detail. Specifically, the risks of damage to the marginal mandibular branch of the facial nerve, infection, scarring, bleeding, prolonged wound healing, incomplete removal, allergy to anesthesia, and recurrence were addressed. Prior to the procedure, the treatment site was clearly identified and confirmed by the patient. All components of Universal Protocol/PAUSE Rule completed.
Z Plasty Text: The lesion was extirpated to the level of the fat with a #15 scalpel blade.  Given the location of the defect, shape of the defect and the proximity to free margins a Z-plasty was deemed most appropriate for repair.  Using a sterile surgical marker, the appropriate transposition arms of the Z-plasty were drawn incorporating the defect and placing the expected incisions within the relaxed skin tension lines where possible.    The area thus outlined was incised deep to adipose tissue with a #15 scalpel blade.  The skin margins were undermined to an appropriate distance in all directions utilizing iris scissors.  The opposing transposition arms were then transposed into place in opposite direction and anchored with interrupted buried subcutaneous sutures.
Epidermal Sutures: 3-0 Surgipro
Epidermal Autograft Text: The defect edges were debeveled with a #15 scalpel blade.  Given the location of the defect, shape of the defect and the proximity to free margins an epidermal autograft was deemed most appropriate.  Using a sterile surgical marker, the primary defect shape was transferred to the donor site. The epidermal graft was then harvested.  The skin graft was then placed in the primary defect and oriented appropriately.
Manual Repair Warning Statement: We plan on removing the manually selected variable below in favor of our much easier automatic structured text blocks found in the previous tab. We decided to do this to help make the flow better and give you the full power of structured data. Manual selection is never going to be ideal in our platform and I would encourage you to avoid using manual selection from this point on, especially since I will be sunsetting this feature. It is important that you do one of two things with the customized text below. First, you can save all of the text in a word file so you can have it for future reference. Second, transfer the text to the appropriate area in the Library tab. Lastly, if there is a flap or graft type which we do not have you need to let us know right away so I can add it in before the variable is hidden. No need to panic, we plan to give you roughly 6 months to make the change.
Suturegard Intro: Intraoperative tissue expansion was performed, utilizing the SUTUREGARD device, in order to reduce wound tension.
Localized Dermabrasion With Wire Brush Text: The patient was draped in routine manner.  Localized dermabrasion using 3 x 17 mm wire brush was performed in routine manner to papillary dermis. This spot dermabrasion is being performed to complete skin cancer reconstruction. It also will eliminate the other sun damaged precancerous cells that are known to be part of the regional effect of a lifetime's worth of sun exposure. This localized dermabrasion is therapeutic and should not be considered cosmetic in any regard.
Posterior Auricular Interpolation Flap Text: A decision was made to reconstruct the defect utilizing an interpolation axial flap and a staged reconstruction.  A telfa template was made of the defect.  This telfa template was then used to outline the posterior auricular interpolation flap.  The donor area for the pedicle flap was then injected with anesthesia.  The flap was excised through the skin and subcutaneous tissue down to the layer of the underlying musculature.  The pedicle flap was carefully excised within this deep plane to maintain its blood supply.  The edges of the donor site were undermined.   The donor site was closed in a primary fashion.  The pedicle was then rotated into position and sutured.  Once the tube was sutured into place, adequate blood supply was confirmed with blanching and refill.  The pedicle was then wrapped with xeroform gauze and dressed appropriately with a telfa and gauze bandage to ensure continued blood supply and protect the attached pedicle.
No Repair - Repaired With Adjacent Surgical Defect Text (Leave Blank If You Do Not Want): After obtaining clear surgical margins the defect was repaired concurrently with another surgical defect which was in close approximation.
Mucosal Advancement Flap Text: Given the location of the defect, shape of the defect and the proximity to free margins a mucosal advancement flap was deemed most appropriate. Incisions were made with a 15 blade scalpel in the appropriate fashion along the cutaneous vermilion border and the mucosal lip. The remaining actinically damaged mucosal tissue was excised.  The mucosal advancement flap was then elevated to the gingival sulcus with care taken to preserve the neurovascular structures and advanced into the primary defect. Care was taken to ensure that precise realignment of the vermilion border was achieved.
Bilateral Helical Rim Advancement Flap Text: The defect edges were debeveled with a #15 blade scalpel.  Given the location of the defect and the proximity to free margins (helical rim) a bilateral helical rim advancement flap was deemed most appropriate.  Using a sterile surgical marker, the appropriate advancement flaps were drawn incorporating the defect and placing the expected incisions between the helical rim and antihelix where possible.  The area thus outlined was incised through and through with a #15 scalpel blade.  With a skin hook and iris scissors, the flaps were gently and sharply undermined and freed up.
O-Z Flap Text: The defect edges were debeveled with a #15 scalpel blade.  Given the location of the defect, shape of the defect and the proximity to free margins an O-Z flap was deemed most appropriate.  Using a sterile surgical marker, an appropriate transposition flap was drawn incorporating the defect and placing the expected incisions within the relaxed skin tension lines where possible. The area thus outlined was incised deep to adipose tissue with a #15 scalpel blade.  The skin margins were undermined to an appropriate distance in all directions utilizing iris scissors.
Rhombic Flap Text: The defect edges were debeveled with a #15 scalpel blade.  Given the location of the defect and the proximity to free margins a rhombic flap was deemed most appropriate.  Using a sterile surgical marker, an appropriate rhombic flap was drawn incorporating the defect.    The area thus outlined was incised deep to adipose tissue with a #15 scalpel blade.  The skin margins were undermined to an appropriate distance in all directions utilizing iris scissors.
Hemigard Intro: Due to skin fragility and wound tension, it was decided to use HEMIGARD adhesive retention suture devices to permit a linear closure. The skin was cleaned and dried for a 6cm distance away from the wound. Excessive hair, if present, was removed to allow for adhesion.
Consent (Near Eyelid Margin)/Introductory Paragraph: The rationale for Mohs was explained to the patient and consent was obtained. The risks, benefits and alternatives to therapy were discussed in detail. Specifically, the risks of ectropion or eyelid deformity, infection, scarring, bleeding, prolonged wound healing, incomplete removal, allergy to anesthesia, nerve injury and recurrence were addressed. Prior to the procedure, the treatment site was clearly identified and confirmed by the patient. All components of Universal Protocol/PAUSE Rule completed.
Where Do You Want The Question To Include Opioid Counseling Located?: Case Summary Tab
Split-Thickness Skin Graft Text: The defect edges were debeveled with a #15 scalpel blade.  Given the location of the defect, shape of the defect and the proximity to free margins a split thickness skin graft was deemed most appropriate.  Using a sterile surgical marker, the primary defect shape was transferred to the donor site. The split thickness graft was then harvested.  The skin graft was then placed in the primary defect and oriented appropriately.
Cartilage Graft Text: The defect edges were debeveled with a #15 scalpel blade.  Given the location of the defect, shape of the defect, the fact the defect involved a full thickness cartilage defect a cartilage graft was deemed most appropriate.  An appropriate donor site was identified, cleansed, and anesthetized. The cartilage graft was then harvested and transferred to the recipient site, oriented appropriately and then sutured into place.  The secondary defect was then repaired using a primary closure.
Island Pedicle Flap With Canthal Suspension Text: The defect edges were debeveled with a #15 scalpel blade.  Given the location of the defect, shape of the defect and the proximity to free margins an island pedicle advancement flap was deemed most appropriate.  Using a sterile surgical marker, an appropriate advancement flap was drawn incorporating the defect, outlining the appropriate donor tissue and placing the expected incisions within the relaxed skin tension lines where possible. The area thus outlined was incised deep to adipose tissue with a #15 scalpel blade.  The skin margins were undermined to an appropriate distance in all directions around the primary defect and laterally outward around the island pedicle utilizing iris scissors.  There was minimal undermining beneath the pedicle flap. A suspension suture was placed in the canthal tendon to prevent tension and prevent ectropion.
Brow Lift Text: A midfrontal incision was made medially to the defect to allow access to the tissues just superior to the left eyebrow. Following careful dissection inferiorly in a supraperiosteal plane to the level of the left eyebrow, several 3-0 monocryl sutures were used to resuspend the eyebrow orbicularis oculi muscular unit to the superior frontal bone periosteum. This resulted in an appropriate reapproximation of static eyebrow symmetry and correction of the left brow ptosis.
Graft Donor Site Bandage (Optional-Leave Blank If You Don't Want In Note): Aquaplast was fitted to the graft site and sewn into place. A pressure bandage were applied to the donor site and over the aquaplast bolster.
Information: Selecting Yes will display possible errors in your note based on the variables you have selected. This validation is only offered as a suggestion for you. PLEASE NOTE THAT THE VALIDATION TEXT WILL BE REMOVED WHEN YOU FINALIZE YOUR NOTE. IF YOU WANT TO FAX A PRELIMINARY NOTE YOU WILL NEED TO TOGGLE THIS TO 'NO' IF YOU DO NOT WANT IT IN YOUR FAXED NOTE.
Consent (Lip)/Introductory Paragraph: The rationale for Mohs was explained to the patient and consent was obtained. The risks, benefits and alternatives to therapy were discussed in detail. Specifically, the risks of lip deformity, changes in the oral aperture, infection, scarring, bleeding, prolonged wound healing, incomplete removal, allergy to anesthesia, nerve injury and recurrence were addressed. Prior to the procedure, the treatment site was clearly identified and confirmed by the patient. All components of Universal Protocol/PAUSE Rule completed.
Transposition Flap Text: The defect edges were debeveled with a #15 scalpel blade.  Given the location of the defect and the proximity to free margins a transposition flap was deemed most appropriate.  Using a sterile surgical marker, an appropriate transposition flap was drawn incorporating the defect.    The area thus outlined was incised deep to adipose tissue with a #15 scalpel blade.  The skin margins were undermined to an appropriate distance in all directions utilizing iris scissors.
Suturegard Body: The suture ends were repeatedly re-tightened and re-clamped to achieve the desired tissue expansion.
Bi-Rhombic Flap Text: The defect edges were debeveled with a #15 scalpel blade.  Given the location of the defect and the proximity to free margins a bi-rhombic flap was deemed most appropriate.  Using a sterile surgical marker, an appropriate rhombic flap was drawn incorporating the defect. The area thus outlined was incised deep to adipose tissue with a #15 scalpel blade.  The skin margins were undermined to an appropriate distance in all directions utilizing iris scissors.
Rotation Flap Text: The defect edges were debeveled with a #15 scalpel blade.  Given the location of the defect, shape of the defect and the proximity to free margins a rotation flap was deemed most appropriate.  Using a sterile surgical marker, an appropriate rotation flap was drawn incorporating the defect and placing the expected incisions within the relaxed skin tension lines where possible.    The area thus outlined was incised deep to adipose tissue with a #15 scalpel blade.  The skin margins were undermined to an appropriate distance in all directions utilizing iris scissors.
Nasal Turnover Hinge Flap Text: The defect edges were debeveled with a #15 scalpel blade.  Given the size, depth, location of the defect and the defect being full thickness a nasal turnover hinge flap was deemed most appropriate.  Using a sterile surgical marker, an appropriate hinge flap was drawn incorporating the defect. The area thus outlined was incised with a #15 scalpel blade. The flap was designed to recreate the nasal mucosal lining and the alar rim. The skin margins were undermined to an appropriate distance in all directions utilizing iris scissors.
Cheiloplasty (Complex) Text: A decision was made to reconstruct the defect with a  cheiloplasty.  The defect was undermined extensively.  Additional obicularis oris muscle was excised with a 15 blade scalpel.  The defect was converted into a full thickness wedge to facilite a better cosmetic result.  Small vessels were then tied off with 5-0 monocyrl. The obicularis oris, superficial fascia, adipose and dermis were then reapproximated.  After the deeper layers were approximated the epidermis was reapproximated with particular care given to realign the vermilion border.
Repair Hemostasis (Optional): Pinpoint electrocautery
Double Z Plasty Text: The lesion was extirpated to the level of the fat with a #15 scalpel blade. Given the location of the defect, shape of the defect and the proximity to free margins a double Z-plasty was deemed most appropriate for repair. Using a sterile surgical marker, the appropriate transposition arms of the double Z-plasty were drawn incorporating the defect and placing the expected incisions within the relaxed skin tension lines where possible. The area thus outlined was incised deep to adipose tissue with a #15 scalpel blade. The skin margins were undermined to an appropriate distance in all directions utilizing iris scissors. The opposing transposition arms were then transposed and carried over into place in opposite direction and anchored with interrupted buried subcutaneous sutures.
Graft Donor Site Epidermal Sutures (Optional): 5-0 Surgipro
Tarsorrhaphy Text: A tarsorrhaphy was performed using Frost sutures.
Mastoid Interpolation Flap Text: A decision was made to reconstruct the defect utilizing an interpolation axial flap and a staged reconstruction.  A telfa template was made of the defect.  This telfa template was then used to outline the mastoid interpolation flap.  The donor area for the pedicle flap was then injected with anesthesia.  The flap was excised through the skin and subcutaneous tissue down to the layer of the underlying musculature.  The pedicle flap was carefully excised within this deep plane to maintain its blood supply.  The edges of the donor site were undermined.   The donor site was closed in a primary fashion.  The pedicle was then rotated into position and sutured.  Once the tube was sutured into place, adequate blood supply was confirmed with blanching and refill.  The pedicle was then wrapped with xeroform gauze and dressed appropriately with a telfa and gauze bandage to ensure continued blood supply and protect the attached pedicle.
Peng Advancement Flap Text: The defect edges were debeveled with a #15 scalpel blade.  Given the location of the defect, shape of the defect and the proximity to free margins a Peng advancement flap was deemed most appropriate.  Using a sterile surgical marker, an appropriate advancement flap was drawn incorporating the defect and placing the expected incisions within the relaxed skin tension lines where possible. The area thus outlined was incised deep to adipose tissue with a #15 scalpel blade.  The skin margins were undermined to an appropriate distance in all directions utilizing iris scissors.
Debridement Text: The wound edges were debrided prior to proceeding with the closure to facilitate wound healing.
Epidermal Closure Graft Donor Site (Optional): running
O-L Flap Text: The defect edges were debeveled with a #15 scalpel blade.  Given the location of the defect, shape of the defect and the proximity to free margins an O-L flap was deemed most appropriate.  Using a sterile surgical marker, an appropriate advancement flap was drawn incorporating the defect and placing the expected incisions within the relaxed skin tension lines where possible.    The area thus outlined was incised deep to adipose tissue with a #15 scalpel blade.  The skin margins were undermined to an appropriate distance in all directions utilizing iris scissors.
Burow's Graft Text: The defect edges were debeveled with a #15 scalpel blade.  Given the location of the defect, shape of the defect, the proximity to free margins and the presence of a standing cone deformity a Burow's skin graft was deemed most appropriate. The standing cone was removed and this tissue was then trimmed to the shape of the primary defect. The adipose tissue was also removed until only dermis and epidermis were left.  The skin margins of the secondary defect were undermined to an appropriate distance in all directions utilizing iris scissors.  The secondary defect was closed with interrupted buried subcutaneous sutures.  The skin edges were then re-apposed with running  sutures.  The skin graft was then placed in the primary defect and oriented appropriately.
Mohs Method Verbiage: An incision at a 45 degree angle following the standard Mohs approach was done and the specimen was harvested as a microscopic controlled layer.
Keystone Flap Text: The defect edges were debeveled with a #15 scalpel blade.  Given the location of the defect, shape of the defect a keystone flap was deemed most appropriate.  Using a sterile surgical marker, an appropriate keystone flap was drawn incorporating the defect, outlining the appropriate donor tissue and placing the expected incisions within the relaxed skin tension lines where possible. The area thus outlined was incised deep to adipose tissue with a #15 scalpel blade.  The skin margins were undermined to an appropriate distance in all directions around the primary defect and laterally outward around the flap utilizing iris scissors.
Rhomboid Transposition Flap Text: The defect edges were debeveled with a #15 scalpel blade.  Given the location of the defect and the proximity to free margins a rhomboid transposition flap was deemed most appropriate.  Using a sterile surgical marker, an appropriate rhomboid flap was drawn incorporating the defect.    The area thus outlined was incised deep to adipose tissue with a #15 scalpel blade.  The skin margins were undermined to an appropriate distance in all directions utilizing iris scissors.
Undermining Type: Entire Wound
Consent Type: Consent 1 (Standard)
Advancement Flap (Single) Text: The defect edges were debeveled with a #15 scalpel blade.  Given the location of the defect and the proximity to free margins a single advancement flap was deemed most appropriate.  Using a sterile surgical marker, an appropriate advancement flap was drawn incorporating the defect and placing the expected incisions within the relaxed skin tension lines where possible.    The area thus outlined was incised deep to adipose tissue with a #15 scalpel blade.  The skin margins were undermined to an appropriate distance in all directions utilizing iris scissors.
Tumor Depth: Less than 6mm from granular layer and no invasion beyond the subcutaneous fat
Paramedian Forehead Flap Text: A decision was made to reconstruct the defect utilizing an interpolation axial flap and a staged reconstruction.  A telfa template was made of the defect.  This telfa template was then used to outline the paramedian forehead pedicle flap.  The donor area for the pedicle flap was then injected with anesthesia.  The flap was excised through the skin and subcutaneous tissue down to the layer of the underlying musculature.  The pedicle flap was carefully excised within this deep plane to maintain its blood supply.  The edges of the donor site were undermined.   The donor site was closed in a primary fashion.  The pedicle was then rotated into position and sutured.  Once the tube was sutured into place, adequate blood supply was confirmed with blanching and refill.  The pedicle was then wrapped with xeroform gauze and dressed appropriately with a telfa and gauze bandage to ensure continued blood supply and protect the attached pedicle.
Full Thickness Lip Wedge Repair (Flap) Text: Given the location of the defect and the proximity to free margins a full thickness wedge repair was deemed most appropriate.  Using a sterile surgical marker, the appropriate repair was drawn incorporating the defect and placing the expected incisions perpendicular to the vermilion border.  The vermilion border was also meticulously outlined to ensure appropriate reapproximation during the repair.  The area thus outlined was incised through and through with a #15 scalpel blade.  The muscularis and dermis were reaproximated with deep sutures following hemostasis. Care was taken to realign the vermilion border before proceeding with the superficial closure.  Once the vermilion was realigned the superfical and mucosal closure was finished.
Consent 2/Introductory Paragraph: Mohs surgery was explained to the patient and consent was obtained. The risks, benefits and alternatives to therapy were discussed in detail. Specifically, the risks of infection, scarring, bleeding, prolonged wound healing, incomplete removal, allergy to anesthesia, nerve injury and recurrence were addressed. Prior to the procedure, the treatment site was clearly identified and confirmed by the patient. All components of Universal Protocol/PAUSE Rule completed.
Consent (Temporal Branch)/Introductory Paragraph: The rationale for Mohs was explained to the patient and consent was obtained. The risks, benefits and alternatives to therapy were discussed in detail. Specifically, the risks of damage to the temporal branch of the facial nerve, infection, scarring, bleeding, prolonged wound healing, incomplete removal, allergy to anesthesia, and recurrence were addressed. Prior to the procedure, the treatment site was clearly identified and confirmed by the patient. All components of Universal Protocol/PAUSE Rule completed.
Burow's Advancement Flap Text: The defect edges were debeveled with a #15 scalpel blade.  Given the location of the defect and the proximity to free margins a Burow's advancement flap was deemed most appropriate.  Using a sterile surgical marker, the appropriate advancement flap was drawn incorporating the defect and placing the expected incisions within the relaxed skin tension lines where possible.    The area thus outlined was incised deep to adipose tissue with a #15 scalpel blade.  The skin margins were undermined to an appropriate distance in all directions utilizing iris scissors.
Pain Refusal Text: I offered to prescribe pain medication but the patient refused to take this medication.
Eye Protection Verbiage: Before proceeding with the stage, a plastic scleral shield was inserted. The globe was anesthetized with a few drops of 1% lidocaine with 1:100,000 epinephrine. Then, an appropriate sized scleral shield was chosen and coated with lacrilube ointment. The shield was gently inserted and left in place for the duration of each stage. After the stage was completed, the shield was gently removed.
Graft Donor Site Dermal Sutures (Optional): 5-0 Polysorb
Abbe Flap (Upper To Lower Lip) Text: The defect of the lower lip was assessed and measured.  Given the location and size of the defect, an Abbe flap was deemed most appropriate.  Using a sterile surgical marker, an appropriate Abbe flap was measured and drawn on the upper lip. Local anesthesia was then infiltrated.  A scalpel was then used to incise the upper lip through and through the skin, vermilion, muscle and mucosa, leaving the flap pedicled on the opposite side.  The flap was then rotated and transferred to the lower lip defect.  The flap was then sutured into place with a three layer technique, closing the orbicularis oris muscle layer with subcutaneous buried sutures, followed by a mucosal layer and an epidermal layer.
Island Pedicle Flap Text: The defect edges were debeveled with a #15 scalpel blade.  Given the location of the defect, shape of the defect and the proximity to free margins an island pedicle advancement flap was deemed most appropriate.  Using a sterile surgical marker, an appropriate advancement flap was drawn incorporating the defect, outlining the appropriate donor tissue and placing the expected incisions within the relaxed skin tension lines where possible.    The area thus outlined was incised deep to adipose tissue with a #15 scalpel blade.  The skin margins were undermined to an appropriate distance in all directions around the primary defect and laterally outward around the island pedicle utilizing iris scissors.  There was minimal undermining beneath the pedicle flap.
Retention Suture Text: Retention sutures were placed to support the closure and prevent dehiscence.
Banner Transposition Flap Text: The defect edges were debeveled with a #15 scalpel blade.  Given the location of the defect and the proximity to free margins a Banner transposition flap was deemed most appropriate.  Using a sterile surgical marker, an appropriate flap drawn around the defect. The area thus outlined was incised deep to adipose tissue with a #15 scalpel blade.  The skin margins were undermined to an appropriate distance in all directions utilizing iris scissors.
Interpolation Flap Text: A decision was made to reconstruct the defect utilizing an interpolation axial flap and a staged reconstruction.  A telfa template was made of the defect.  This telfa template was then used to outline the interpolation flap.  The donor area for the pedicle flap was then injected with anesthesia.  The flap was excised through the skin and subcutaneous tissue down to the layer of the underlying musculature.  The interpolation flap was carefully excised within this deep plane to maintain its blood supply.  The edges of the donor site were undermined.   The donor site was closed in a primary fashion.  The pedicle was then rotated into position and sutured.  Once the tube was sutured into place, adequate blood supply was confirmed with blanching and refill.  The pedicle was then wrapped with xeroform gauze and dressed appropriately with a telfa and gauze bandage to ensure continued blood supply and protect the attached pedicle.
Subsequent Stages Histo Method Verbiage: Using a similar technique to that described above, a thin layer of tissue was removed from all areas where tumor was visible on the previous stage.  The tissue was again oriented, mapped, dyed, and processed as above.
Which Instrument Did You Use For Dermabrasion?: Wire Brush
Localized Dermabrasion With Sand Papertext: The patient was draped in routine manner.  Localized dermabrasion using sterile sand paper was performed in routine manner to papillary dermis. This spot dermabrasion is being performed to complete skin cancer reconstruction. It also will eliminate the other sun damaged precancerous cells that are known to be part of the regional effect of a lifetime's worth of sun exposure. This localized dermabrasion is therapeutic and should not be considered cosmetic in any regard.
Eyelid Full Thickness Repair - 24240: The eyelid defect was full thickness which required a wedge repair of the eyelid. Special care was taken to ensure that the eyelid margin was realligned when placing sutures.
Which Eyelid Repair Cpt Are You Using?: 09244
Localized Dermabrasion With 15 Blade Text: The patient was draped in routine manner.  Localized dermabrasion using a 15 blade was performed in routine manner to papillary dermis. This spot dermabrasion is being performed to complete skin cancer reconstruction. It also will eliminate the other sun damaged precancerous cells that are known to be part of the regional effect of a lifetime's worth of sun exposure. This localized dermabrasion is therapeutic and should not be considered cosmetic in any regard.
Eyelid Partial Thickness Repair - 26953: The eyelid defect was partial thickness which required a wedge repair of the eyelid. Special care was taken to ensure that the eyelid margin was realligned when placing sutures.
Bill 59 Modifier?: No - Continue to Bill 79 Modifier
Nasalis Myocutaneous Flap Text: Using a #15 blade, an incision was made around the donor flap to the level of the nasalis muscle. Wide lateral undermining was then performed in both the subcutaneous plane above the nasalis muscle, and in a submuscular plane just above periosteum. This allowed the formation of a free nasalis muscle axial pedicle which was still attached to the actual cutaneous flap, increasing its mobility and vascular viability. Hemostasis was obtained with pinpoint electrocoagulation. The flap was mobilized into position and the pivotal anchor points positioned and stabilized with buried interrupted sutures. Subcutaneous and dermal tissues were closed in a multilayered fashion with sutures. Tissue redundancies were excised, and the epidermal edges were apposed without significant tension and sutured with sutures.
Rectangular Flap Text: The defect edges were debeveled with a #15 scalpel blade. Given the location of the defect and the proximity to free margins a rectangular flap was deemed most appropriate. Using a sterile surgical marker, an appropriate rectangular flap was drawn incorporating the defect. The area thus outlined was incised deep to adipose tissue with a #15 scalpel blade. The skin margins were undermined to an appropriate distance in all directions utilizing iris scissors. Following this, the designed flap was carried over into the primary defect and sutured into place.

## 2024-05-21 DIAGNOSIS — Z79.4 TYPE 2 DIABETES MELLITUS WITH HYPERGLYCEMIA, WITH LONG-TERM CURRENT USE OF INSULIN (HCC): ICD-10-CM

## 2024-05-21 DIAGNOSIS — E11.65 TYPE 2 DIABETES MELLITUS WITH HYPERGLYCEMIA, WITH LONG-TERM CURRENT USE OF INSULIN (HCC): ICD-10-CM

## 2024-05-21 RX ORDER — INSULIN DEGLUDEC 100 U/ML
INJECTION, SOLUTION SUBCUTANEOUS
Qty: 30 ML | Refills: 6 | Status: SHIPPED | OUTPATIENT
Start: 2024-05-21

## 2024-05-22 ENCOUNTER — APPOINTMENT (RX ONLY)
Dept: URBAN - METROPOLITAN AREA CLINIC 31 | Facility: CLINIC | Age: 74
Setting detail: DERMATOLOGY
End: 2024-05-22

## 2024-05-22 DIAGNOSIS — Z48.02 ENCOUNTER FOR REMOVAL OF SUTURES: ICD-10-CM

## 2024-05-22 PROCEDURE — 99024 POSTOP FOLLOW-UP VISIT: CPT

## 2024-05-22 PROCEDURE — ? SUTURE REMOVAL (GLOBAL PERIOD)

## 2024-05-22 ASSESSMENT — LOCATION ZONE DERM
LOCATION ZONE: TRUNK
LOCATION ZONE: SCALP

## 2024-05-22 ASSESSMENT — LOCATION DETAILED DESCRIPTION DERM
LOCATION DETAILED: LEFT MEDIAL SUPERIOR CHEST
LOCATION DETAILED: LEFT SUPERIOR OCCIPITAL SCALP

## 2024-05-22 ASSESSMENT — LOCATION SIMPLE DESCRIPTION DERM
LOCATION SIMPLE: LEFT OCCIPITAL SCALP
LOCATION SIMPLE: CHEST

## 2024-05-22 NOTE — PROCEDURE: SUTURE REMOVAL (GLOBAL PERIOD)
Detail Level: Detailed
Add 69654 Cpt? (Important Note: In 2017 The Use Of 96965 Is Being Tracked By Cms To Determine Future Global Period Reimbursement For Global Periods): yes

## 2024-06-10 DIAGNOSIS — E29.1 HYPOGONADISM IN MALE: ICD-10-CM

## 2024-06-11 RX ORDER — TESTOSTERONE CYPIONATE 200 MG/ML
INJECTION, SOLUTION INTRAMUSCULAR
Qty: 2 ML | Refills: 5 | Status: SHIPPED | OUTPATIENT
Start: 2024-06-11 | End: 2024-07-09

## 2024-08-01 ENCOUNTER — OFFICE VISIT (OUTPATIENT)
Dept: ENDOCRINOLOGY | Facility: MEDICAL CENTER | Age: 74
End: 2024-08-01
Attending: INTERNAL MEDICINE
Payer: MEDICARE

## 2024-08-01 VITALS
SYSTOLIC BLOOD PRESSURE: 106 MMHG | HEART RATE: 69 BPM | OXYGEN SATURATION: 91 % | DIASTOLIC BLOOD PRESSURE: 70 MMHG | BODY MASS INDEX: 26.64 KG/M2 | HEIGHT: 75 IN | WEIGHT: 214.3 LBS

## 2024-08-01 DIAGNOSIS — E78.5 DYSLIPIDEMIA: ICD-10-CM

## 2024-08-01 DIAGNOSIS — Z79.4 TYPE 2 DIABETES MELLITUS WITH HYPERGLYCEMIA, WITH LONG-TERM CURRENT USE OF INSULIN (HCC): ICD-10-CM

## 2024-08-01 DIAGNOSIS — E11.65 TYPE 2 DIABETES MELLITUS WITH HYPERGLYCEMIA, WITH LONG-TERM CURRENT USE OF INSULIN (HCC): ICD-10-CM

## 2024-08-01 DIAGNOSIS — E55.9 VITAMIN D DEFICIENCY: ICD-10-CM

## 2024-08-01 DIAGNOSIS — E29.1 HYPOGONADISM IN MALE: ICD-10-CM

## 2024-08-01 DIAGNOSIS — Z79.4 LONG-TERM INSULIN USE (HCC): ICD-10-CM

## 2024-08-01 DIAGNOSIS — E11.22 CKD STAGE 3 DUE TO TYPE 2 DIABETES MELLITUS (HCC): ICD-10-CM

## 2024-08-01 DIAGNOSIS — N18.30 CKD STAGE 3 DUE TO TYPE 2 DIABETES MELLITUS (HCC): ICD-10-CM

## 2024-08-01 LAB
HBA1C MFR BLD: 7.9 % (ref ?–5.8)
POCT INT CON NEG: NEGATIVE
POCT INT CON POS: POSITIVE

## 2024-08-01 PROCEDURE — 3074F SYST BP LT 130 MM HG: CPT | Performed by: INTERNAL MEDICINE

## 2024-08-01 PROCEDURE — 99215 OFFICE O/P EST HI 40 MIN: CPT | Performed by: INTERNAL MEDICINE

## 2024-08-01 PROCEDURE — 99212 OFFICE O/P EST SF 10 MIN: CPT | Performed by: INTERNAL MEDICINE

## 2024-08-01 PROCEDURE — 3078F DIAST BP <80 MM HG: CPT | Performed by: INTERNAL MEDICINE

## 2024-08-01 PROCEDURE — 83036 HEMOGLOBIN GLYCOSYLATED A1C: CPT | Performed by: INTERNAL MEDICINE

## 2024-08-01 RX ORDER — TIRZEPATIDE 5 MG/.5ML
5 INJECTION, SOLUTION SUBCUTANEOUS
Qty: 2 ML | Refills: 5 | Status: SHIPPED | OUTPATIENT
Start: 2024-08-01

## 2024-08-01 RX ORDER — TESTOSTERONE CYPIONATE 200 MG/ML
100 INJECTION, SOLUTION INTRAMUSCULAR
Qty: 2 ML | Refills: 5 | Status: SHIPPED | OUTPATIENT
Start: 2024-08-01 | End: 2024-08-29

## 2024-08-01 NOTE — PROGRESS NOTES
CHIEF COMPLAINT: Patient is here for follow up of Type 2 Diabetes Mellitus    HPI:     Kaveh Mayfield is a 73 y.o. male with Type 2 Diabetes Mellitus here for follow up.    Labs from 8/1/2024 show A1c is 7.9%  Labs from 1/11/2024 show A1c is 7.1%  Labs from 5/18/2023 show a1c was 8.1%  Labs from 11/18/2022 HbA1c was 7.2%    Comorbid issues include chronic kidney disease stage III and hypogonadism  He usually gets his labs done at [facility]  He lives in [place]  He cannot tolerate higher doses of Trulicity because of nausea and vomiting        Currently he is on  Tresiba 31 units daily  Jardiance 25 mg daily  Trulicity 1.5 mg weekly ( not taking)        He stopped Trulicity as it started bothering him (GI upset and diarrhea)        He has hyperlipidemia and is on pravastatin 40 mg daily  He has coronary artery disease  He is being followed by Dr. Reeves  His LDL cholesterol was 80 on 7/2024         He did not have diabetic kidney disease but now has new onset albuminuria   He does have chronic kidney disease stage III  His last serum creatinine was 2.27 on July 2024  He is on an ACE inhibitor  Lisinopril 5 mg daily  His blood pressure is well controlled  UACR was 28 on 7/2024   UACR was 100 on 12/2023 ([place])  UACR was 48 on 5/2023       Eye exam was last done on 5/18/2023         With respect to his hypogonadism he is on IM testosterone 200 mg every 2 weeks  He denies chest pain  He denies lower urinary tract obstructive symptoms  His testosterone is 1592 on July 2024  His testosterone is 699 on 12/2023  HCT was 58%  Today we discussed that he needs to have phlebotomy at [facility] or in any facility [place]       His TSH was normal 1.16 on July 2024  His vitamin D was 33 on July 2023        BG Diary:  Patient forgot her list meter    Weight has been stable    Diabetes Complications   Retinopathy: No known retinopathy.  Last eye exam: 5/18/2023  Neuropathy: Denies paresthesias or numbness  in hands or feet. Denies any foot wounds.  Exercise: Minimal.  Diet: Fair.  Patient's medications, allergies, and social histories were reviewed and updated as appropriate.    ROS:     CONS:     No fever, no chills   EYES:     No diplopia, no blurry vision   CV:           No chest pain, no palpitations   PULM:     No SOB, no cough, no hemoptysis.   GI:            No nausea, no vomiting, no diarrhea, no constipation   ENDO:     No polyuria, no polydipsia, no heat intolerance, no cold intolerance       Past Medical History:  Problem List:  2022-11: Hypogonadism in male  2021-10: Polyneuropathy  2021-10: Renal insufficiency syndrome  2021-10: Atrial fibrillation ([facility])  2021-10: Coronary artery disease  2021-10: Hyperlipidemia  2021-10: Essential (primary) hypertension  2021-10: Diabetes mellitus, type 2 ([facility])  2020-11: CKD stage 3 due to type 2 diabetes mellitus ([facility])  2020-06: Type 2 diabetes mellitus with hyperglycemia, with long-term   current use of insulin ([facility])  2020-06: Dyslipidemia  2020-06: CKD stage 3 due to type 1 diabetes mellitus ([facility])  2020-06: Long-term insulin use ([facility])  2011-06: CAD (coronary artery disease)  2011-06: A-fib ([facility])  2011-06: HTN (hypertension)  2011-06: Elevated lipids      Past Surgical History:  Past Surgical History:   Procedure Laterality Date    SHOULDER ARTHROSCOPY W/ ROTATOR CUFF REPAIR  8/7/2013    Performed by Mickey Liu M.D. at SURGERY [facility] ORS    CLAVICLE DISTAL EXCISION  8/7/2013    Performed by Mickey Liu M.D. at [facility] ORS    SHOULDER DECOMPRESSION ARTHROSCOPIC  8/7/2013    Performed by Mickey Liu M.D. at [facility] ORS    ANGIOGRAM  2006    stent placement        Allergies:  Nkda [no known drug allergy]     Social History:  Social History     Tobacco Use    Smoking status: Former     Current packs/day: 0.00     Average packs/day: 3.0 packs/day for 32.0 years (96.0 ttl pk-yrs)     Types: Cigarettes     Start  "date: 1974     Quit date: 2006     Years since quittin.3    Smokeless tobacco: Never   Vaping Use    Vaping status: Never Used   Substance Use Topics    Alcohol use: Yes     Alcohol/week: 1.5 oz     Types: 3 drink(s) per week    Drug use: No        Family History:   family history includes Heart Disease in an other family member; Hypertension in an other family member; Stroke in an other family member.      PHYSICAL EXAM:   OBJECTIVE:  Vital signs: /70   Pulse 69   Ht 1.905 m (6' 3\") Comment: pt stated  Wt 97.2 kg (214 lb 4.8 oz)   SpO2 91%   BMI 26.79 kg/m²   GENERAL: Well-developed, well-nourished in no apparent distress.   EYE:  No ocular asymmetry, PERRLA  HENT: Pink, moist mucous membranes.    NECK: No thyromegaly.   CARDIOVASCULAR:  No murmurs  LUNGS: Clear breath sounds  ABDOMEN: Soft, nontender   EXTREMITIES: No clubbing, cyanosis, or edema.   NEUROLOGICAL: No gross focal motor abnormalities   LYMPH: No cervical adenopathy palpated.   SKIN: No rashes, lesions.     Labs:  Lab Results   Component Value Date/Time    HBA1C 7.9 (A) 2024 02:18 PM        Lab Results   Component Value Date/Time    WBC 8.9 10/17/2016 09:46 PM    RBC 4.52 (L) 10/17/2016 09:46 PM    HEMOGLOBIN 13.9 2018 11:26 AM    HEMOGLOBIN 15.1 10/17/2016 09:46 PM    .5 (H) 10/17/2016 09:46 PM    MCH 33.4 (H) 10/17/2016 09:46 PM    MCHC 32.9 (L) 10/17/2016 09:46 PM    RDW 70.4 (H) 10/17/2016 09:46 PM    MPV 11.1 10/17/2016 09:46 PM       Lab Results   Component Value Date/Time    SODIUM 135 (L) 2018 05:33 AM    SODIUM 131 (L) 10/17/2016 09:46 PM    POTASSIUM 4.3 2018 05:33 AM    POTASSIUM 4.8 10/17/2016 09:46 PM    CHLORIDE 103 2018 05:33 AM    CHLORIDE 97 10/17/2016 09:46 PM    CO2 23 2018 05:33 AM    CO2 23 10/17/2016 09:46 PM    ANION 9 2018 05:33 AM    ANION 11.0 10/17/2016 09:46 PM    GLUCOSE 171 (H) 2018 05:33 AM    GLUCOSE 200 (H) 10/17/2016 09:46 PM    BUN 26 (H) " 08/16/2018 05:33 AM    BUN 19 10/17/2016 09:46 PM    CREATININE 2.27 05/15/2023 12:00 AM    CALCIUM 9.5 08/16/2018 05:33 AM    CALCIUM 9.8 10/17/2016 09:46 PM    ASTSGOT 46 (H) 10/17/2016 09:46 PM    ALTSGPT 29 10/17/2016 09:46 PM    TBILIRUBIN 1.0 10/17/2016 09:46 PM    ALBUMIN 4.6 10/17/2016 09:46 PM    TOTPROTEIN 7.7 10/17/2016 09:46 PM    GLOBULIN 3.1 10/17/2016 09:46 PM    AGRATIO 1.5 10/17/2016 09:46 PM       Lab Results   Component Value Date/Time    CHOLSTRLTOT 129 12/17/2021 0000    TRIGLYCERIDE 170 12/17/2021 0000    HDL 28 12/17/2021 0000    LDL 67 12/17/2021 0000       Lab Results   Component Value Date/Time    MICROALBCALC 48.9 (A) 05/15/2023 12:00 AM        No results found for: TSHULTRASEN  No results found for: FREEDIR  No results found for: FREET3  No results found for: THYSTIMIG        ASSESSMENT/PLAN:     1. Type 2 diabetes mellitus with hyperglycemia, with long-term current use of insulin ([facility])  Slightly deteriorated control  A1c is suboptimal at 7.9%  Stop Trulicity due to side effects  Start mounjaro 5mg weekly reviewed how to properly take new medication  Continue Tresiba 31 units daily  Continue Jardiance 25 mg daily  RTC in 3 mos July     2. Hypogonadism in male  Uncontrolled   Testosterone is high  Reduce IM testosterone 100 mg every 2 weeks  Repeat testosterone and hematocrit levels in 6 months    3. 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 Dyslipidemia  Stable  Continue pravastatin  Repeat fasting lipids in 6 months     5. Long-term insulin use ([facility])  Patient is on long-term basal insulin with other oral agents for type 2 diabetes management    6. Vitamin D deficiency  Controlled  Continue vitamin D3 2000 IU daily  Repeat 25-hydroxy vitamin D levels with upcoming labs in 6 months      Return in about 3 months (around 11/1/2024).    Total time spent on day  of service was over 60 minutes which included obtaining a detailed history and physical exam, ordering labs, coordinating care and scheduling future follow-up    Thank you kindly for allowing me to participate in the diabetes care plan for this patient.    Espinoza Poole MD, FACE, [facility]      CC:   MARCO ARIAS P.A.-C.

## 2024-10-29 LAB — HBA1C MFR BLD: 18.7 % (ref ?–5.8)

## 2024-10-31 DIAGNOSIS — E11.65 TYPE 2 DIABETES MELLITUS WITH HYPERGLYCEMIA, WITH LONG-TERM CURRENT USE OF INSULIN (HCC): ICD-10-CM

## 2024-10-31 DIAGNOSIS — Z79.4 TYPE 2 DIABETES MELLITUS WITH HYPERGLYCEMIA, WITH LONG-TERM CURRENT USE OF INSULIN (HCC): ICD-10-CM

## 2024-11-04 ENCOUNTER — NON-PROVIDER VISIT (OUTPATIENT)
Dept: ENDOCRINOLOGY | Facility: MEDICAL CENTER | Age: 74
End: 2024-11-04
Attending: INTERNAL MEDICINE
Payer: MEDICARE

## 2024-11-04 VITALS
BODY MASS INDEX: 26.24 KG/M2 | SYSTOLIC BLOOD PRESSURE: 108 MMHG | DIASTOLIC BLOOD PRESSURE: 70 MMHG | OXYGEN SATURATION: 94 % | HEIGHT: 75 IN | HEART RATE: 76 BPM | WEIGHT: 211 LBS

## 2024-11-04 DIAGNOSIS — E29.1 HYPOGONADISM IN MALE: ICD-10-CM

## 2024-11-04 DIAGNOSIS — E55.9 VITAMIN D DEFICIENCY: ICD-10-CM

## 2024-11-04 DIAGNOSIS — E78.5 DYSLIPIDEMIA: ICD-10-CM

## 2024-11-04 DIAGNOSIS — Z79.4 TYPE 2 DIABETES MELLITUS WITH HYPERGLYCEMIA, WITH LONG-TERM CURRENT USE OF INSULIN (HCC): ICD-10-CM

## 2024-11-04 DIAGNOSIS — E11.65 TYPE 2 DIABETES MELLITUS WITH HYPERGLYCEMIA, WITH LONG-TERM CURRENT USE OF INSULIN (HCC): ICD-10-CM

## 2024-11-04 LAB — HBA1C MFR BLD: 8.4 % (ref ?–5.8)

## 2024-11-04 PROCEDURE — 99212 OFFICE O/P EST SF 10 MIN: CPT | Performed by: INTERNAL MEDICINE

## 2024-11-04 NOTE — PROGRESS NOTES
RN-CDE Note    Subjective:   Endocrinology Clinic Progress Note  PCP: David Joyner P.A.-C.    HPI:  Kaveh Mayfield is a 73 y.o. old patient who is seen today by the Diabetes Nurse Specialist for review of Type 2 Diabetes.  Recent changes in health: Health good.  He did not tolerate Trulicity but is doing well on Mounjaro.  DM:   Last A1c: This is a wrong result  Lab Results   Component Value Date/Time    HBA1C 18.7 (A) 10/29/2024 12:00 AM   HBA1C was 7.9 on 8/1/24  HBA1C today is 8.4  A1C GOAL: < 7    Diabetes Medications:   Mounjaro 5 mg weekly  Tresiba 32 units daily  Jardiance 25 mg daily         Exercise: Walking  Diet: Traveling and eating out a lot    Exercise and nutrition counseling were performed at this visit.    Glucose monitoring frequency: Testing fasting  130-140  Hypoglycemic episodes: no    Last Retinal Exam:  February with Dr. Schaffer  Daily Foot Exam: Yes   Foot Exam:  Monofilament: done  Monofilament testing with a 10 gram force: sensation intact: decreased bilaterally  Visual Inspection: Feet with maceration, ulcers, fissures.  Right big toe nail thick   Pedal pulses: intact bilaterally   Lab Results   Component Value Date/Time    MICROALBCALC 48.9 (A) 05/15/2023 12:00 AM        ACR Albumin/Creatinine Ratio goal <30     HTN:   Blood pressure goal <130/<80   Currently Rx ACE/ARB:  yes    Dyslipidemia:    Lab Results   Component Value Date/Time    CHOLSTRLTOT 157 05/15/2023 12:00 AM    LDL 61 05/15/2023 12:00 AM    HDL 29 (A) 05/15/2023 12:00 AM    TRIGLYCERIDE 266 (A) 05/15/2023 12:00 AM         Currently Rx Statin:  yes      He  reports that he quit smoking about 18 years ago. His smoking use included cigarettes. He started smoking about 50 years ago. He has a 96 pack-year smoking history. He has never used smokeless tobacco.    Plan:     Discussed and educated on:   - All medications, side effects and compliance (discussed carefully)  - Annual eye examinations at Ophthalmology  - Home glucose  monitoring emphasized  - Weight control and daily exercise    Recommended medication changes: He will donate blood to bring down his Hemoglobin and Hematocrit.  Continue Mounjaro 5 mg weekly ( just got back on 1 month ago) and Jardiance 25 mg daily.  He will adjust his Tresiba as needed to keep his fasting blood sugars .  He will follow up with Dr. Poole in 6 months.

## 2025-01-21 DIAGNOSIS — Z79.4 TYPE 2 DIABETES MELLITUS WITH HYPERGLYCEMIA, WITH LONG-TERM CURRENT USE OF INSULIN (HCC): ICD-10-CM

## 2025-01-21 DIAGNOSIS — E11.65 TYPE 2 DIABETES MELLITUS WITH HYPERGLYCEMIA, WITH LONG-TERM CURRENT USE OF INSULIN (HCC): ICD-10-CM

## 2025-01-21 RX ORDER — INSULIN DEGLUDEC 100 U/ML
INJECTION, SOLUTION SUBCUTANEOUS
Qty: 30 ML | Refills: 6 | OUTPATIENT
Start: 2025-01-21

## 2025-01-21 RX ORDER — TIRZEPATIDE 5 MG/.5ML
5 INJECTION, SOLUTION SUBCUTANEOUS
Qty: 6 ML | Refills: 1 | Status: SHIPPED | OUTPATIENT
Start: 2025-01-21

## 2025-01-21 RX ORDER — DULAGLUTIDE 0.75 MG/.5ML
INJECTION, SOLUTION SUBCUTANEOUS
OUTPATIENT
Start: 2025-01-21

## 2025-01-21 RX ORDER — INSULIN DEGLUDEC 100 U/ML
32 INJECTION, SOLUTION SUBCUTANEOUS DAILY
Qty: 30 ML | Refills: 3 | Status: SHIPPED | OUTPATIENT
Start: 2025-01-21

## 2025-01-21 RX ORDER — TIRZEPATIDE 5 MG/.5ML
5 INJECTION, SOLUTION SUBCUTANEOUS
Qty: 2 ML | Refills: 2 | OUTPATIENT
Start: 2025-01-21

## 2025-01-22 ENCOUNTER — TELEPHONE (OUTPATIENT)
Dept: ENDOCRINOLOGY | Facility: MEDICAL CENTER | Age: 75
End: 2025-01-22
Payer: MEDICARE

## 2025-01-22 NOTE — TELEPHONE ENCOUNTER
Patient's wife (sharon) is calling to verify if there is a standing prescription for patient in regards to his tresiba. I informed her there was a prescription approved by Dr Poole yesterday, it is currently pending with our pharmacy coordinators to see if a prior authorization is needed.     No questions or concerns at this time. She states patient is on his last pen.

## 2025-01-22 NOTE — TELEPHONE ENCOUNTER
Hi Ladies,     Can you release patients prescription please? He is currently on his last pen.     Thank you,   Gabriela Lo, Med Ass't

## 2025-01-28 DIAGNOSIS — E11.22 CKD STAGE 3 DUE TO TYPE 2 DIABETES MELLITUS (HCC): ICD-10-CM

## 2025-01-28 DIAGNOSIS — N18.30 CKD STAGE 3 DUE TO TYPE 2 DIABETES MELLITUS (HCC): ICD-10-CM

## 2025-01-28 RX ORDER — LISINOPRIL 5 MG/1
5 TABLET ORAL DAILY
Qty: 90 TABLET | Refills: 2 | Status: SHIPPED | OUTPATIENT
Start: 2025-01-28

## 2025-02-19 ENCOUNTER — APPOINTMENT (OUTPATIENT)
Dept: URBAN - METROPOLITAN AREA CLINIC 31 | Facility: CLINIC | Age: 75
Setting detail: DERMATOLOGY
End: 2025-02-19

## 2025-02-19 DIAGNOSIS — L81.4 OTHER MELANIN HYPERPIGMENTATION: ICD-10-CM

## 2025-02-19 DIAGNOSIS — L57.0 ACTINIC KERATOSIS: ICD-10-CM

## 2025-02-19 DIAGNOSIS — L82.1 OTHER SEBORRHEIC KERATOSIS: ICD-10-CM

## 2025-02-19 DIAGNOSIS — Z71.89 OTHER SPECIFIED COUNSELING: ICD-10-CM

## 2025-02-19 DIAGNOSIS — D18.0 HEMANGIOMA: ICD-10-CM

## 2025-02-19 PROBLEM — D18.01 HEMANGIOMA OF SKIN AND SUBCUTANEOUS TISSUE: Status: ACTIVE | Noted: 2025-02-19

## 2025-02-19 PROCEDURE — 17000 DESTRUCT PREMALG LESION: CPT

## 2025-02-19 PROCEDURE — 17003 DESTRUCT PREMALG LES 2-14: CPT

## 2025-02-19 PROCEDURE — ? COUNSELING

## 2025-02-19 PROCEDURE — 99213 OFFICE O/P EST LOW 20 MIN: CPT | Mod: 25

## 2025-02-19 PROCEDURE — ? LIQUID NITROGEN

## 2025-02-19 ASSESSMENT — LOCATION SIMPLE DESCRIPTION DERM
LOCATION SIMPLE: RIGHT FOREHEAD
LOCATION SIMPLE: RIGHT UPPER BACK
LOCATION SIMPLE: LEFT FOREARM
LOCATION SIMPLE: RIGHT SCALP

## 2025-02-19 ASSESSMENT — LOCATION ZONE DERM
LOCATION ZONE: TRUNK
LOCATION ZONE: SCALP
LOCATION ZONE: ARM
LOCATION ZONE: FACE

## 2025-02-19 ASSESSMENT — LOCATION DETAILED DESCRIPTION DERM
LOCATION DETAILED: RIGHT SUPERIOR UPPER BACK
LOCATION DETAILED: RIGHT CENTRAL FRONTAL SCALP
LOCATION DETAILED: RIGHT INFERIOR UPPER BACK
LOCATION DETAILED: RIGHT SUPERIOR FOREHEAD
LOCATION DETAILED: RIGHT MEDIAL FRONTAL SCALP
LOCATION DETAILED: LEFT DISTAL DORSAL FOREARM

## 2025-02-19 NOTE — PROCEDURE: COUNSELING
Detail Level: Generalized
Sunscreen Recommendations: Sun screen (SPF 30 or greater) should be applied during peak UV exposure (between 10am and 2pm) and reapplied after exercise or swimming.\\nRecommended La Roche- Posay Anthelios with SPF 60 or Mike Tone Water Babies with SPF 30 and above.

## 2025-02-19 NOTE — PROCEDURE: MIPS QUALITY
Quality 226: Preventive Care And Screening: Tobacco Use: Screening And Cessation Intervention: Patient screened for tobacco use and is an ex/non-smoker
Quality 110: Preventive Care And Screening: Influenza Immunization: Influenza Immunization Administered during Influenza season
Quality 111:Pneumonia Vaccination Status For Older Adults: Patient received any pneumococcal conjugate or polysaccharide vaccine on or after their 60th birthday and before the end of the measurement period
Quality 130: Documentation Of Current Medications In The Medical Record: Current Medications Documented
Detail Level: Detailed
Quality 431: Preventive Care And Screening: Unhealthy Alcohol Use - Screening: Patient not identified as an unhealthy alcohol user when screened for unhealthy alcohol use using a systematic screening method

## 2025-02-25 ENCOUNTER — TELEPHONE (OUTPATIENT)
Dept: ENDOCRINOLOGY | Facility: MEDICAL CENTER | Age: 75
End: 2025-02-25
Payer: MEDICARE

## 2025-02-25 DIAGNOSIS — E29.1 HYPOGONADISM IN MALE: ICD-10-CM

## 2025-02-25 RX ORDER — TESTOSTERONE CYPIONATE 200 MG/ML
200 INJECTION, SOLUTION INTRAMUSCULAR
Qty: 2 ML | Refills: 5 | Status: SHIPPED | OUTPATIENT
Start: 2025-02-25 | End: 2025-03-25

## 2025-02-25 NOTE — TELEPHONE ENCOUNTER
Pt is needing refill of testosterone sent to [facility] in [place]. Pt also stated he was having trouble getting tresiba. Pt stated [employer] care will not cover medication. Please advise

## 2025-02-26 ENCOUNTER — APPOINTMENT (OUTPATIENT)
Dept: ENDOCRINOLOGY | Facility: MEDICAL CENTER | Age: 75
End: 2025-02-26
Attending: INTERNAL MEDICINE
Payer: MEDICARE

## 2025-02-26 NOTE — TELEPHONE ENCOUNTER
I called [facility] @ [phone] and spoke with pharmacist. He confirmed that the Tresiba is covered, but it's too soon to fill at this time. He stated that the prescription was last filled on 1/30/25 (30mL/88-day supply) and then next fill is available on 4/5/25 per insurance. **Sig on prescription is: Inject 32iu SubQ every day 30mL= 93-day supply.**    I called patient @ [phone] to advise. There was no answer and I left a general voice message with my call back number.     Thank you,  Mary Jonas, [facility]  Endocrinology Pharmacy Coordinator

## 2025-05-01 DIAGNOSIS — E11.65 TYPE 2 DIABETES MELLITUS WITH HYPERGLYCEMIA, WITH LONG-TERM CURRENT USE OF INSULIN (HCC): ICD-10-CM

## 2025-05-01 DIAGNOSIS — Z79.4 TYPE 2 DIABETES MELLITUS WITH HYPERGLYCEMIA, WITH LONG-TERM CURRENT USE OF INSULIN (HCC): ICD-10-CM

## 2025-05-01 RX ORDER — EMPAGLIFLOZIN 25 MG/1
1 TABLET, FILM COATED ORAL DAILY
Qty: 90 TABLET | Refills: 2 | Status: SHIPPED | OUTPATIENT
Start: 2025-05-01

## 2025-05-07 ENCOUNTER — OFFICE VISIT (OUTPATIENT)
Dept: ENDOCRINOLOGY | Facility: MEDICAL CENTER | Age: 75
End: 2025-05-07
Attending: INTERNAL MEDICINE
Payer: MEDICARE

## 2025-05-07 ENCOUNTER — HOSPITAL ENCOUNTER (OUTPATIENT)
Facility: MEDICAL CENTER | Age: 75
End: 2025-05-07
Attending: INTERNAL MEDICINE
Payer: MEDICARE

## 2025-05-07 VITALS
SYSTOLIC BLOOD PRESSURE: 122 MMHG | HEIGHT: 75 IN | HEART RATE: 74 BPM | DIASTOLIC BLOOD PRESSURE: 68 MMHG | WEIGHT: 212 LBS | OXYGEN SATURATION: 92 % | BODY MASS INDEX: 26.36 KG/M2

## 2025-05-07 DIAGNOSIS — D75.1 POLYCYTHEMIA: ICD-10-CM

## 2025-05-07 DIAGNOSIS — E55.9 VITAMIN D DEFICIENCY: ICD-10-CM

## 2025-05-07 DIAGNOSIS — E11.65 TYPE 2 DIABETES MELLITUS WITH HYPERGLYCEMIA, WITH LONG-TERM CURRENT USE OF INSULIN (HCC): ICD-10-CM

## 2025-05-07 DIAGNOSIS — E78.5 DYSLIPIDEMIA: ICD-10-CM

## 2025-05-07 DIAGNOSIS — Z79.4 LONG-TERM INSULIN USE (HCC): ICD-10-CM

## 2025-05-07 DIAGNOSIS — Z79.4 TYPE 2 DIABETES MELLITUS WITH HYPERGLYCEMIA, WITH LONG-TERM CURRENT USE OF INSULIN (HCC): ICD-10-CM

## 2025-05-07 DIAGNOSIS — N18.30 CKD STAGE 3 DUE TO TYPE 2 DIABETES MELLITUS (HCC): ICD-10-CM

## 2025-05-07 DIAGNOSIS — E29.1 HYPOGONADISM IN MALE: ICD-10-CM

## 2025-05-07 DIAGNOSIS — E11.22 CKD STAGE 3 DUE TO TYPE 2 DIABETES MELLITUS (HCC): ICD-10-CM

## 2025-05-07 LAB
CREAT UR-MCNC: 106 MG/DL
HBA1C MFR BLD: 7.5 % (ref ?–5.8)
MICROALBUMIN UR-MCNC: 5.3 MG/DL
MICROALBUMIN/CREAT UR: 50 MG/G (ref 0–30)
POCT INT CON NEG: NEGATIVE
POCT INT CON POS: POSITIVE

## 2025-05-07 PROCEDURE — 82043 UR ALBUMIN QUANTITATIVE: CPT

## 2025-05-07 PROCEDURE — 99213 OFFICE O/P EST LOW 20 MIN: CPT | Performed by: INTERNAL MEDICINE

## 2025-05-07 PROCEDURE — 99215 OFFICE O/P EST HI 40 MIN: CPT | Performed by: INTERNAL MEDICINE

## 2025-05-07 PROCEDURE — 81219 CALR GENE COM VARIANTS: CPT

## 2025-05-07 PROCEDURE — 81338 MPL GENE COMMON VARIANTS: CPT

## 2025-05-07 PROCEDURE — 36415 COLL VENOUS BLD VENIPUNCTURE: CPT

## 2025-05-07 PROCEDURE — 3078F DIAST BP <80 MM HG: CPT | Performed by: INTERNAL MEDICINE

## 2025-05-07 PROCEDURE — 83036 HEMOGLOBIN GLYCOSYLATED A1C: CPT | Performed by: INTERNAL MEDICINE

## 2025-05-07 PROCEDURE — 3074F SYST BP LT 130 MM HG: CPT | Performed by: INTERNAL MEDICINE

## 2025-05-07 PROCEDURE — 81270 JAK2 GENE: CPT

## 2025-05-07 PROCEDURE — 82570 ASSAY OF URINE CREATININE: CPT

## 2025-05-07 RX ORDER — TESTOSTERONE CYPIONATE 200 MG/ML
100 INJECTION, SOLUTION INTRAMUSCULAR
Qty: 2 ML | Refills: 5 | Status: SHIPPED | OUTPATIENT
Start: 2025-05-07 | End: 2025-06-04

## 2025-05-07 RX ORDER — INSULIN DEGLUDEC 100 U/ML
32 INJECTION, SOLUTION SUBCUTANEOUS DAILY
Qty: 30 ML | Refills: 3 | Status: SHIPPED | OUTPATIENT
Start: 2025-05-07

## 2025-05-07 RX ORDER — TIRZEPATIDE 5 MG/.5ML
5 INJECTION, SOLUTION SUBCUTANEOUS
Qty: 6 ML | Refills: 1 | Status: SHIPPED | OUTPATIENT
Start: 2025-05-07

## 2025-05-07 NOTE — PROGRESS NOTES
CHIEF COMPLAINT: Patient is here for follow up of Type 2 Diabetes Mellitus    HPI:     Kaveh Mayfield is a 74 y.o. male with Type 2 Diabetes Mellitus here for follow up.      Labs from 5/7/2025 show A1c is 7.5%  Labs from 8/1/2024 show A1c is 7.9%  Labs from 1/11/2024 show A1c is 7.1%  Labs from 5/18/2023 show a1c was 8.1%  Labs from 11/18/2022 HbA1c was 7.2%    Comorbid issues include chronic kidney disease stage III and hypogonadism  He usually gets his labs done at [facility]  He lives in [place]  He cannot tolerate higher doses of Trulicity because of nausea and vomiting        Currently he is on  Tresiba 33 units daily  Jardiance 25 mg daily  Mounjaro 5mg every 10 days ( see below)        He still has nausea with Mounjaro and so he takes it every 10 days and not every 7 days   He feels nausea for 3 days post injection and it disappears        He has hyperlipidemia and is on pravastatin 40 mg daily  He has coronary artery disease  He is being followed by Dr. Lala Sanders from [facility]           He did not have diabetic kidney disease but now has new onset albuminuria   He does have chronic kidney disease stage III  His last serum creatinine was 1.89  on 4/2025  He is on an ACE inhibitor  Lisinopril 5 mg daily  His blood pressure is well controlled    We are getting a urine test today as it was not done          UACR was 28 on 7/2024   UACR was 100 on 12/2023 ([place])  UACR was 48 on 5/2023       Eye exam was last done on 2/2025 but we dont have records         With respect to his hypogonadism he is on IM testosterone 100 mg every 2 weeks  We reduced his dose due to polycythemia ( he is a non smoker and he does not have NAZANIN)  He denies chest pain  He denies lower urinary tract obstructive symptoms      His testosterone levels are lower      We discussed that he needs to donate blood        Previous labs    His testosterone is 1592 on July 2024  His testosterone is 699 on 12/2023      His  TSH was normal 1.16 on 4/2025  His vitamin D was 30 on 4./2025        BG Diary:  Patient forgot her list meter    Weight has been stable    Diabetes Complications   Retinopathy: No known retinopathy.  Last eye exam 2/2025  Neuropathy: Denies paresthesias or numbness in hands or feet. Denies any foot wounds.  Exercise: Minimal.  Diet: Fair.  Patient's medications, allergies, and social histories were reviewed and updated as appropriate.    ROS:     CONS:     No fever, no chills   EYES:     No diplopia, no blurry vision   CV:           No chest pain, no palpitations   PULM:     No SOB, no cough, no hemoptysis.   GI:            No nausea, no vomiting, no diarrhea, no constipation   ENDO:     No polyuria, no polydipsia, no heat intolerance, no cold intolerance       Past Medical History:  Problem List:  2022-11: Hypogonadism in male  2021-10: Polyneuropathy  2021-10: Renal insufficiency syndrome  2021-10: Atrial fibrillation ([facility])  2021-10: Coronary artery disease  2021-10: Hyperlipidemia  2021-10: Essential (primary) hypertension  2021-10: Diabetes mellitus, type 2 ([facility])  2020-11: CKD stage 3 due to type 2 diabetes mellitus ([facility])  2020-06: Type 2 diabetes mellitus with hyperglycemia, with long-term   current use of insulin ([facility])  2020-06: Dyslipidemia  2020-06: CKD stage 3 due to type 1 diabetes mellitus ([facility])  2020-06: Long-term insulin use ([facility])  2011-06: CAD (coronary artery disease)  2011-06: A-fib ([facility])  2011-06: HTN (hypertension)  2011-06: Elevated lipids      Past Surgical History:  Past Surgical History:   Procedure Laterality Date    SHOULDER ARTHROSCOPY W/ ROTATOR CUFF REPAIR  8/7/2013    Performed by Mickey Liu M.D. at [facility]    CLAVICLE DISTAL EXCISION  8/7/2013    Performed by Mickey Liu M.D. at [facility]    SHOULDER DECOMPRESSION ARTHROSCOPIC  8/7/2013    Performed by Mickey Liu M.D. at [facility]    ANGIOGRAM  2006    stent  "placement        Allergies:  Nkda [no known drug allergy]     Social History:  Social History     Tobacco Use    Smoking status: Former     Current packs/day: 0.00     Average packs/day: 3.0 packs/day for 32.0 years (96.0 ttl pk-yrs)     Types: Cigarettes     Start date: 1974     Quit date: 2006     Years since quittin.1    Smokeless tobacco: Never   Vaping Use    Vaping status: Never Used   Substance Use Topics    Alcohol use: Yes     Alcohol/week: 1.5 oz     Types: 3 drink(s) per week    Drug use: No        Family History:   family history includes Heart Disease in an other family member; Hypertension in an other family member; Stroke in an other family member.      PHYSICAL EXAM:   OBJECTIVE:  Vital signs: /68 (BP Location: Left arm, Patient Position: Sitting, BP Cuff Size: Adult long)   Pulse 74   Ht 1.905 m (6' 3\")   Wt 96.2 kg (212 lb)   SpO2 92%   BMI 26.50 kg/m²   GENERAL: Well-developed, well-nourished in no apparent distress.   EYE:  No ocular asymmetry, PERRLA  HENT: Pink, moist mucous membranes.    NECK: No thyromegaly.   CARDIOVASCULAR:  No murmurs  LUNGS: Clear breath sounds  ABDOMEN: Soft, nontender   EXTREMITIES: No clubbing, cyanosis, or edema.   NEUROLOGICAL: No gross focal motor abnormalities   LYMPH: No cervical adenopathy palpated.   SKIN: No rashes, lesions.     Labs:  Lab Results   Component Value Date/Time    HBA1C 8.4 (A) 2024 12:00 AM        Lab Results   Component Value Date/Time    WBC 8.9 10/17/2016 09:46 PM    RBC 4.52 (L) 10/17/2016 09:46 PM    HEMOGLOBIN 13.9 2018 11:26 AM    HEMOGLOBIN 15.1 10/17/2016 09:46 PM    .5 (H) 10/17/2016 09:46 PM    MCH 33.4 (H) 10/17/2016 09:46 PM    MCHC 32.9 (L) 10/17/2016 09:46 PM    RDW 70.4 (H) 10/17/2016 09:46 PM    MPV 11.1 10/17/2016 09:46 PM       Lab Results   Component Value Date/Time    SODIUM 135 (L) 2018 05:33 AM    SODIUM 131 (L) 10/17/2016 09:46 PM    POTASSIUM 4.3 2018 05:33 AM    " POTASSIUM 4.8 10/17/2016 09:46 PM    CHLORIDE 103 08/16/2018 05:33 AM    CHLORIDE 97 10/17/2016 09:46 PM    CO2 23 08/16/2018 05:33 AM    CO2 23 10/17/2016 09:46 PM    ANION 9 08/16/2018 05:33 AM    ANION 11.0 10/17/2016 09:46 PM    GLUCOSE 171 (H) 08/16/2018 05:33 AM    GLUCOSE 200 (H) 10/17/2016 09:46 PM    BUN 26 (H) 08/16/2018 05:33 AM    BUN 19 10/17/2016 09:46 PM    CREATININE 2.27 05/15/2023 12:00 AM    CALCIUM 9.5 08/16/2018 05:33 AM    CALCIUM 9.8 10/17/2016 09:46 PM    ASTSGOT 46 (H) 10/17/2016 09:46 PM    ALTSGPT 29 10/17/2016 09:46 PM    TBILIRUBIN 1.0 10/17/2016 09:46 PM    ALBUMIN 4.6 10/17/2016 09:46 PM    TOTPROTEIN 7.7 10/17/2016 09:46 PM    GLOBULIN 3.1 10/17/2016 09:46 PM    AGRATIO 1.5 10/17/2016 09:46 PM       Lab Results   Component Value Date/Time    CHOLSTRLTOT 129 12/17/2021 0000    TRIGLYCERIDE 170 12/17/2021 0000    HDL 28 12/17/2021 0000    LDL 67 12/17/2021 0000       Lab Results   Component Value Date/Time    MICROALBCALC 48.9 (A) 05/15/2023 12:00 AM        No results found for: TSHULTRASEN  No results found for: FREEDIR  No results found for: FREET3  No results found for: THYSTIMIG        ASSESSMENT/PLAN:     1. Type 2 diabetes mellitus with hyperglycemia, with long-term current use of insulin (HCC)  Slightly deteriorated control  A1c is 7.5%  Continue Mounjaro 7.5mg every 10 days   Continue Tresiba 31 units daily  Continue Jardiance 25 mg daily   We are getting a urine microalbumin today  RTC in 3 mos     2. Hypogonadism in male  Improved  Testosterone is better but he still has polycythemia recommended he donate blood   recommend that he skip 1 dose of testosterone  Continue  IM testosterone 100 mg every 2 weeks  Repeat testosterone and hematocrit levels in 6 months    3. 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 Dyslipidemia  Stable  Continue  pravastatin  Repeat fasting lipids in 12 months     5. Long-term insulin use (HCC)  Patient is on long-term basal insulin with other oral agents for type 2 diabetes management    6. Vitamin D deficiency  Controlled  Continue vitamin D3 2000 IU daily  Repeat 25-hydroxy vitamin D levels with upcoming labs in 6 months    Return in about 3 months (around 8/7/2025).      Total time spent on day of service was over 60 minutes which included obtaining a detailed history and physical exam, ordering labs, coordinating care and scheduling future follow-up    Thank you kindly for allowing me to participate in the diabetes care plan for this patient.    Espinoza Poole MD, FACE, NU      CC:   MARCO ARIAS P.A.-C.

## 2025-05-08 NOTE — Clinical Note
REFERRAL APPROVAL NOTICE         Sent on May 8, 2025                   Kaveh Mayfield  [address]  [facility] [address]                   Dear Mr. Mayfield,    After a careful review of the medical information and benefit coverage, [facility] has processed your referral. See below for additional details.    If applicable, you must be actively enrolled with your insurance for coverage of the authorized service. If you have any questions regarding your coverage, please contact your insurance directly.    REFERRAL INFORMATION   Referral #:  [id number]  Referred-To Department    Referred-By Provider:  Benjamín Poole M.D.   Endocrinology [facility]      [address]  [address]  [place] [id number] [phone] [address]  [place] [id number] [phone]    Referral Start Date:  05/07/2025  Referral End Date:   05/07/2026           SCHEDULING  If you do not already have an appointment, please call [phone] to make an appointment.   MORE INFORMATION  As a reminder, [facility] ownership has changed, meaning this location is now owned and operated by [facility]. As such, we want to clarify that our patients should expect to receive two separate bills for the services received at [facility] - one representing the [facility] facility fees as the owner of the establishment, and the other to represent the physician's services and subsequent fees. You can speak with your insurance carrier for a pricing estimate by calling the customer service number on the back of your card and ask about charges for a hospital outpatient visit.  If you do not already have a [employer] account, sign up at: [email or web address]  You can access your medical information, make appointments, see lab results, billing information, and more.  If you have questions regarding this referral, please contact  the [facility] Referrals department at:             [phone].  Monday - Friday 7:30AM - 5:00PM.      Sincerely,  [facility]

## 2025-05-15 ENCOUNTER — RESULTS FOLLOW-UP (OUTPATIENT)
Dept: ENDOCRINOLOGY | Facility: MEDICAL CENTER | Age: 75
End: 2025-05-15

## 2025-05-15 LAB
JAK2 P.V617F BLD/T QL: NOT DETECTED
SPECIMEN SOURCE: NORMAL

## 2025-05-19 LAB
GENE XXX MUT ANL BLD/T: NOT DETECTED
MPL P.W515 BLD/T QL: NOT DETECTED
SPECIMEN SOURCE: NORMAL
SPECIMEN SOURCE: NORMAL

## 2025-08-04 ENCOUNTER — TELEPHONE (OUTPATIENT)
Dept: ENDOCRINOLOGY | Facility: MEDICAL CENTER | Age: 75
End: 2025-08-04
Payer: MEDICARE

## 2025-08-19 ENCOUNTER — NON-PROVIDER VISIT (OUTPATIENT)
Dept: ENDOCRINOLOGY | Facility: MEDICAL CENTER | Age: 75
End: 2025-08-19
Attending: INTERNAL MEDICINE
Payer: MEDICARE

## 2025-08-19 VITALS
WEIGHT: 213 LBS | DIASTOLIC BLOOD PRESSURE: 70 MMHG | HEART RATE: 66 BPM | BODY MASS INDEX: 26.49 KG/M2 | HEIGHT: 75 IN | SYSTOLIC BLOOD PRESSURE: 118 MMHG | OXYGEN SATURATION: 95 %

## 2025-08-19 DIAGNOSIS — E55.9 VITAMIN D DEFICIENCY: ICD-10-CM

## 2025-08-19 DIAGNOSIS — N18.30 CKD STAGE 3 DUE TO TYPE 2 DIABETES MELLITUS (HCC): ICD-10-CM

## 2025-08-19 DIAGNOSIS — E11.65 TYPE 2 DIABETES MELLITUS WITH HYPERGLYCEMIA, WITH LONG-TERM CURRENT USE OF INSULIN (HCC): Primary | ICD-10-CM

## 2025-08-19 DIAGNOSIS — I15.9 SECONDARY HYPERTENSION: ICD-10-CM

## 2025-08-19 DIAGNOSIS — E11.22 CKD STAGE 3 DUE TO TYPE 2 DIABETES MELLITUS (HCC): ICD-10-CM

## 2025-08-19 DIAGNOSIS — E78.1 HYPERTRIGLYCERIDEMIA: ICD-10-CM

## 2025-08-19 DIAGNOSIS — E78.5 DYSLIPIDEMIA: ICD-10-CM

## 2025-08-19 DIAGNOSIS — I48.91 ATRIAL FIBRILLATION, UNSPECIFIED TYPE (HCC): ICD-10-CM

## 2025-08-19 DIAGNOSIS — Z79.4 TYPE 2 DIABETES MELLITUS WITH HYPERGLYCEMIA, WITH LONG-TERM CURRENT USE OF INSULIN (HCC): Primary | ICD-10-CM

## 2025-08-19 LAB
HBA1C MFR BLD: 9.1 % (ref ?–5.8)
POCT INT CON NEG: NEGATIVE
POCT INT CON POS: POSITIVE

## 2025-08-19 PROCEDURE — 99213 OFFICE O/P EST LOW 20 MIN: CPT | Performed by: INTERNAL MEDICINE

## 2025-08-19 PROCEDURE — 83036 HEMOGLOBIN GLYCOSYLATED A1C: CPT

## 2025-08-19 RX ORDER — AMLODIPINE BESYLATE 5 MG/1
5 TABLET ORAL DAILY
Qty: 90 TABLET | Refills: 3 | Status: SHIPPED | OUTPATIENT
Start: 2025-08-19

## 2025-08-19 RX ORDER — LISINOPRIL 5 MG/1
5 TABLET ORAL DAILY
Qty: 90 TABLET | Refills: 2 | Status: SHIPPED | OUTPATIENT
Start: 2025-08-19

## 2025-08-19 RX ORDER — FENOFIBRATE 160 MG/1
160 TABLET ORAL DAILY
Qty: 90 TABLET | Refills: 3 | Status: SHIPPED | OUTPATIENT
Start: 2025-08-19

## 2025-08-19 RX ORDER — PRAVASTATIN SODIUM 40 MG
TABLET ORAL
Qty: 90 TABLET | Refills: 3 | Status: SHIPPED | OUTPATIENT
Start: 2025-08-19

## 2025-08-19 RX ORDER — GLIMEPIRIDE 2 MG/1
2 TABLET ORAL 2 TIMES DAILY
Qty: 200 TABLET | Refills: 3 | Status: SHIPPED | OUTPATIENT
Start: 2025-08-19

## 2025-08-19 RX ORDER — SOTALOL HYDROCHLORIDE 80 MG/1
80 TABLET ORAL DAILY
Qty: 90 TABLET | Refills: 3 | Status: SHIPPED | OUTPATIENT
Start: 2025-08-19

## 2025-08-19 RX ORDER — EMPAGLIFLOZIN 25 MG/1
1 TABLET, FILM COATED ORAL DAILY
Qty: 90 TABLET | Refills: 2 | Status: SHIPPED | OUTPATIENT
Start: 2025-08-19